# Patient Record
Sex: FEMALE | NOT HISPANIC OR LATINO | Employment: OTHER | URBAN - NONMETROPOLITAN AREA
[De-identification: names, ages, dates, MRNs, and addresses within clinical notes are randomized per-mention and may not be internally consistent; named-entity substitution may affect disease eponyms.]

---

## 2018-07-03 ENCOUNTER — OFFICE VISIT CONVERTED (OUTPATIENT)
Dept: FAMILY MEDICINE CLINIC | Age: 63
End: 2018-07-03
Attending: FAMILY MEDICINE

## 2018-07-03 ENCOUNTER — CONVERSION ENCOUNTER (OUTPATIENT)
Dept: OTHER | Facility: HOSPITAL | Age: 63
End: 2018-07-03

## 2018-07-23 ENCOUNTER — CONVERSION ENCOUNTER (OUTPATIENT)
Dept: MAMMOGRAPHY | Facility: HOSPITAL | Age: 63
End: 2018-07-23

## 2019-01-07 ENCOUNTER — HOSPITAL ENCOUNTER (OUTPATIENT)
Dept: OTHER | Facility: HOSPITAL | Age: 64
Discharge: HOME | End: 2019-01-07
Attending: FAMILY MEDICINE

## 2019-01-07 ENCOUNTER — OFFICE VISIT CONVERTED (OUTPATIENT)
Dept: FAMILY MEDICINE CLINIC | Age: 64
End: 2019-01-07
Attending: FAMILY MEDICINE

## 2019-01-07 LAB
ALBUMIN SERPL-MCNC: 4.9 G/DL (ref 3.5–5)
ALBUMIN/GLOB SERPL: 2.1 {RATIO} (ref 1.4–2.6)
ALP SERPL-CCNC: 39 U/L (ref 43–160)
ALT SERPL-CCNC: 12 U/L (ref 10–40)
ANION GAP SERPL CALC-SCNC: 16 MMOL/L (ref 8–19)
AST SERPL-CCNC: 14 U/L (ref 15–50)
BILIRUB SERPL-MCNC: 0.31 MG/DL (ref 0.2–1.3)
BUN SERPL-MCNC: 8 MG/DL (ref 5–25)
BUN/CREAT SERPL: 10 {RATIO} (ref 6–20)
CALCIUM SERPL-MCNC: 9.2 MG/DL (ref 8.7–10.4)
CHLORIDE SERPL-SCNC: 100 MMOL/L (ref 99–111)
CONV CO2: 27 MMOL/L (ref 22–32)
CONV TOTAL PROTEIN: 7.2 G/DL (ref 6.3–8.2)
CREAT UR-MCNC: 0.82 MG/DL (ref 0.5–0.9)
ERYTHROCYTE [DISTWIDTH] IN BLOOD BY AUTOMATED COUNT: 14.4 % (ref 11.5–14.5)
FERRITIN SERPL-MCNC: 230 NG/ML (ref 10–200)
FOLATE SERPL-MCNC: >20 NG/ML (ref 4.8–20)
GFR SERPLBLD BASED ON 1.73 SQ M-ARVRAT: >60 ML/MIN/{1.73_M2}
GLOBULIN UR ELPH-MCNC: 2.3 G/DL (ref 2–3.5)
GLUCOSE SERPL-MCNC: 95 MG/DL (ref 65–99)
HBA1C MFR BLD: 14.3 G/DL (ref 12–16)
HCT VFR BLD AUTO: 45.2 % (ref 37–47)
IRON SERPL-MCNC: 70 UG/DL (ref 60–170)
MCH RBC QN AUTO: 25.4 PG (ref 27–31)
MCHC RBC AUTO-ENTMCNC: 31.6 G/DL (ref 33–37)
MCV RBC AUTO: 80.4 FL (ref 81–99)
OSMOLALITY SERPL CALC.SUM OF ELEC: 286 MOSM/KG (ref 273–304)
PLATELET # BLD AUTO: 389 10*3/UL (ref 130–400)
PMV BLD AUTO: 9.6 FL (ref 7.4–10.4)
POTASSIUM SERPL-SCNC: 3.7 MMOL/L (ref 3.5–5.3)
RBC # BLD AUTO: 5.62 10*6/UL (ref 4.2–5.4)
SODIUM SERPL-SCNC: 139 MMOL/L (ref 135–147)
T4 FREE SERPL-MCNC: 1.4 NG/DL (ref 0.9–1.8)
TSH SERPL-ACNC: 5.32 M[IU]/L (ref 0.27–4.2)
VIT B12 SERPL-MCNC: >2000 PG/ML (ref 211–911)
WBC # BLD AUTO: 7.05 10*3/UL (ref 4.8–10.8)

## 2019-01-08 LAB — B BURGDOR IGG+IGM SER-ACNC: NORMAL

## 2019-03-08 ENCOUNTER — HOSPITAL ENCOUNTER (OUTPATIENT)
Dept: OTHER | Facility: HOSPITAL | Age: 64
Discharge: HOME | End: 2019-03-08
Attending: FAMILY MEDICINE

## 2019-03-08 LAB — TSH SERPL-ACNC: 0.66 M[IU]/L (ref 0.27–4.2)

## 2019-06-19 ENCOUNTER — HOSPITAL ENCOUNTER (OUTPATIENT)
Dept: MAMMOGRAPHY | Facility: HOSPITAL | Age: 64
Discharge: HOME | End: 2019-06-19

## 2019-08-19 ENCOUNTER — OFFICE VISIT CONVERTED (OUTPATIENT)
Dept: FAMILY MEDICINE CLINIC | Age: 64
End: 2019-08-19
Attending: FAMILY MEDICINE

## 2019-08-19 ENCOUNTER — HOSPITAL ENCOUNTER (OUTPATIENT)
Dept: OTHER | Facility: HOSPITAL | Age: 64
Discharge: HOME | End: 2019-08-19
Attending: FAMILY MEDICINE

## 2019-08-19 LAB
ALBUMIN SERPL-MCNC: 4.6 G/DL (ref 3.5–5)
ALBUMIN/GLOB SERPL: 1.8 {RATIO} (ref 1.4–2.6)
ALP SERPL-CCNC: 47 U/L (ref 43–160)
ALT SERPL-CCNC: 14 U/L (ref 10–40)
ANION GAP SERPL CALC-SCNC: 16 MMOL/L (ref 8–19)
AST SERPL-CCNC: 16 U/L (ref 15–50)
BASOPHILS # BLD MANUAL: 0.06 10*3/UL (ref 0–0.2)
BASOPHILS NFR BLD MANUAL: 1 % (ref 0–3)
BILIRUB SERPL-MCNC: 0.28 MG/DL (ref 0.2–1.3)
BUN SERPL-MCNC: 9 MG/DL (ref 5–25)
BUN/CREAT SERPL: 13 {RATIO} (ref 6–20)
CALCIUM SERPL-MCNC: 9.3 MG/DL (ref 8.7–10.4)
CHLORIDE SERPL-SCNC: 103 MMOL/L (ref 99–111)
CHOLEST SERPL-MCNC: 204 MG/DL (ref 107–200)
CHOLEST/HDLC SERPL: 3.8 {RATIO} (ref 3–6)
CONV CO2: 27 MMOL/L (ref 22–32)
CONV TOTAL PROTEIN: 7.2 G/DL (ref 6.3–8.2)
CREAT UR-MCNC: 0.71 MG/DL (ref 0.5–0.9)
DEPRECATED RDW RBC AUTO: 42.3 FL
EOSINOPHIL # BLD MANUAL: 0.02 10*3/UL (ref 0–0.7)
EOSINOPHIL NFR BLD MANUAL: 0.3 % (ref 0–7)
ERYTHROCYTE [DISTWIDTH] IN BLOOD BY AUTOMATED COUNT: 14.7 % (ref 11.5–14.5)
GFR SERPLBLD BASED ON 1.73 SQ M-ARVRAT: >60 ML/MIN/{1.73_M2}
GLOBULIN UR ELPH-MCNC: 2.6 G/DL (ref 2–3.5)
GLUCOSE SERPL-MCNC: 91 MG/DL (ref 65–99)
GRANS (ABSOLUTE): 3.69 10*3/UL (ref 2–8)
GRANS: 61.7 % (ref 30–85)
HBA1C MFR BLD: 14.6 G/DL (ref 12–16)
HCT VFR BLD AUTO: 45.9 % (ref 37–47)
HDLC SERPL-MCNC: 54 MG/DL (ref 40–60)
IMM GRANULOCYTES # BLD: 0.01 10*3/UL (ref 0–0.54)
IMM GRANULOCYTES NFR BLD: 0.2 % (ref 0–0.43)
LDLC SERPL CALC-MCNC: 120 MG/DL (ref 70–100)
LYMPHOCYTES # BLD MANUAL: 1.8 10*3/UL (ref 1–5)
LYMPHOCYTES NFR BLD MANUAL: 6.7 % (ref 3–10)
MCH RBC QN AUTO: 25.4 PG (ref 27–31)
MCHC RBC AUTO-ENTMCNC: 31.8 G/DL (ref 33–37)
MCV RBC AUTO: 79.8 FL (ref 81–99)
MONOCYTES # BLD AUTO: 0.4 10*3/UL (ref 0.2–1.2)
OSMOLALITY SERPL CALC.SUM OF ELEC: 292 MOSM/KG (ref 273–304)
PLATELET # BLD AUTO: 401 10*3/UL (ref 130–400)
PMV BLD AUTO: 9.6 FL (ref 7.4–10.4)
POTASSIUM SERPL-SCNC: 4 MMOL/L (ref 3.5–5.3)
RBC # BLD AUTO: 5.75 10*6/UL (ref 4.2–5.4)
SODIUM SERPL-SCNC: 142 MMOL/L (ref 135–147)
TRIGL SERPL-MCNC: 152 MG/DL (ref 40–150)
TSH SERPL-ACNC: 0.27 M[IU]/L (ref 0.27–4.2)
VARIANT LYMPHS NFR BLD MANUAL: 30.1 % (ref 20–45)
VLDLC SERPL-MCNC: 30 MG/DL (ref 5–37)
WBC # BLD AUTO: 5.98 10*3/UL (ref 4.8–10.8)

## 2020-01-06 ENCOUNTER — OFFICE VISIT CONVERTED (OUTPATIENT)
Dept: FAMILY MEDICINE CLINIC | Age: 65
End: 2020-01-06
Attending: FAMILY MEDICINE

## 2020-07-17 ENCOUNTER — HOSPITAL ENCOUNTER (OUTPATIENT)
Dept: OTHER | Facility: HOSPITAL | Age: 65
Discharge: HOME | End: 2020-07-17
Attending: OBSTETRICS & GYNECOLOGY

## 2020-07-20 ENCOUNTER — OFFICE VISIT CONVERTED (OUTPATIENT)
Dept: FAMILY MEDICINE CLINIC | Age: 65
End: 2020-07-20
Attending: FAMILY MEDICINE

## 2020-07-21 LAB
25(OH)D3 SERPL-MCNC: 50.2 NG/ML
ALBUMIN SERPL-MCNC: 4.7 G/DL
ALBUMIN/GLOB SERPL: 1.9 {RATIO}
ALP SERPL-CCNC: 44 IU/L
ALT SERPL-CCNC: 21 IU/L
AST SERPL-CCNC: 21 IU/L
BILIRUB SERPL-MCNC: 0.3 MG/DL
BUN SERPL-MCNC: 9 MG/DL
BUN/CREAT SERPL: 11
CALCIUM SERPL-MCNC: 9.4 MG/DL
CHLORIDE SERPL-SCNC: 102 MMOL/L
CHOLEST SERPL-MCNC: 225 MG/DL
CO2 SERPL-SCNC: 26 MMOL/L
CONV TOTAL PROTEIN: 7.2 G/DL
CREAT UR-MCNC: 0.82 MG/DL
ERYTHROCYTE [DISTWIDTH] IN BLOOD BY AUTOMATED COUNT: 14.6 %
GLOBULIN UR ELPH-MCNC: 2.5 G/DL
GLUCOSE SERPL-MCNC: 99 MG/DL
HCT VFR BLD AUTO: 43.5 %
HDLC SERPL-MCNC: 71 MG/DL
HGB BLD-MCNC: 14.4 G/DL
IRON SERPL-MCNC: 112 UG/DL
LDLC SERPL CALC-MCNC: 132 MG/DL
MCH RBC QN AUTO: 26.4 PG
MCHC RBC AUTO-ENTMCNC: 33.1 G/DL
MCV RBC AUTO: 80 FL
PLATELET # BLD AUTO: 401 X10E3/UL
POTASSIUM SERPL-SCNC: 4.4 MMOL/L
RBC # BLD AUTO: 5.45 X10E6/UL
SODIUM SERPL-SCNC: 142 MMOL/L
TRIGL SERPL-MCNC: 109 MG/DL
TSH SERPL-ACNC: 3.67 UIU/ML
VIT B12 SERPL-MCNC: 1566 PG/ML
VLDLC SERPL-MCNC: 22 MG/DL
WBC # BLD AUTO: 6.9 X10E3/UL

## 2021-04-26 ENCOUNTER — HOSPITAL ENCOUNTER (OUTPATIENT)
Dept: OTHER | Facility: HOSPITAL | Age: 66
Discharge: HOME | End: 2021-04-26
Attending: FAMILY MEDICINE

## 2021-04-26 ENCOUNTER — OFFICE VISIT CONVERTED (OUTPATIENT)
Dept: FAMILY MEDICINE CLINIC | Age: 66
End: 2021-04-26
Attending: FAMILY MEDICINE

## 2021-04-26 LAB
ALBUMIN SERPL-MCNC: 4.7 G/DL (ref 3.5–5)
ALBUMIN/GLOB SERPL: 1.8 {RATIO} (ref 1.4–2.6)
ALP SERPL-CCNC: 45 U/L (ref 43–160)
ALT SERPL-CCNC: 19 U/L (ref 10–40)
ANION GAP SERPL CALC-SCNC: 15 MMOL/L (ref 8–19)
AST SERPL-CCNC: 20 U/L (ref 15–50)
BILIRUB SERPL-MCNC: 0.33 MG/DL (ref 0.2–1.3)
BUN SERPL-MCNC: 9 MG/DL (ref 5–25)
BUN/CREAT SERPL: 13 {RATIO} (ref 6–20)
CALCIUM SERPL-MCNC: 9 MG/DL (ref 8.7–10.4)
CHLORIDE SERPL-SCNC: 104 MMOL/L (ref 99–111)
CHOLEST SERPL-MCNC: 218 MG/DL (ref 107–200)
CHOLEST/HDLC SERPL: 3.1 {RATIO} (ref 3–6)
CONV CO2: 28 MMOL/L (ref 22–32)
CONV TOTAL PROTEIN: 7.3 G/DL (ref 6.3–8.2)
CREAT UR-MCNC: 0.72 MG/DL (ref 0.5–0.9)
ERYTHROCYTE [DISTWIDTH] IN BLOOD BY AUTOMATED COUNT: 15.3 % (ref 11.5–14.5)
FERRITIN SERPL-MCNC: 212 NG/ML (ref 10–200)
FOLATE SERPL-MCNC: >20 NG/ML (ref 4.8–20)
GFR SERPLBLD BASED ON 1.73 SQ M-ARVRAT: >60 ML/MIN/{1.73_M2}
GLOBULIN UR ELPH-MCNC: 2.6 G/DL (ref 2–3.5)
GLUCOSE SERPL-MCNC: 96 MG/DL (ref 65–99)
HBA1C MFR BLD: 14.2 G/DL (ref 12–16)
HCT VFR BLD AUTO: 44.6 % (ref 37–47)
HDLC SERPL-MCNC: 70 MG/DL (ref 40–60)
IRON SATN MFR SERPL: 36 % (ref 20–55)
IRON SERPL-MCNC: 127 UG/DL (ref 60–170)
LDLC SERPL CALC-MCNC: 129 MG/DL (ref 70–100)
MCH RBC QN AUTO: 26 PG (ref 27–31)
MCHC RBC AUTO-ENTMCNC: 31.8 G/DL (ref 33–37)
MCV RBC AUTO: 81.7 FL (ref 81–99)
OSMOLALITY SERPL CALC.SUM OF ELEC: 295 MOSM/KG (ref 273–304)
PLATELET # BLD AUTO: 342 10*3/UL (ref 130–400)
PMV BLD AUTO: 9 FL (ref 7.4–10.4)
POTASSIUM SERPL-SCNC: 4.1 MMOL/L (ref 3.5–5.3)
RBC # BLD AUTO: 5.46 10*6/UL (ref 4.2–5.4)
SODIUM SERPL-SCNC: 143 MMOL/L (ref 135–147)
T4 FREE SERPL-MCNC: 1.6 NG/DL (ref 0.9–1.8)
TIBC SERPL-MCNC: 356 UG/DL (ref 245–450)
TRANSFERRIN SERPL-MCNC: 249 MG/DL (ref 250–380)
TRIGL SERPL-MCNC: 95 MG/DL (ref 40–150)
TSH SERPL-ACNC: 2 M[IU]/L (ref 0.27–4.2)
VIT B12 SERPL-MCNC: 1452 PG/ML (ref 211–911)
VLDLC SERPL-MCNC: 19 MG/DL (ref 5–37)
WBC # BLD AUTO: 6.17 10*3/UL (ref 4.8–10.8)

## 2021-05-03 ENCOUNTER — HOSPITAL ENCOUNTER (OUTPATIENT)
Dept: OTHER | Facility: HOSPITAL | Age: 66
Discharge: HOME | End: 2021-05-03
Attending: OBSTETRICS & GYNECOLOGY

## 2021-05-18 NOTE — PROGRESS NOTES
Radha Mittal  1955     Office/Outpatient Visit    Visit Date:  09:38 am    Provider: Edwin Melissa MD (Assistant: Gini Davis RN)    Location: [facility]        Electronically signed by Edwin Melissa MD on  2020 09:08:50 AM                             Subjective:        CC: 'this rash is...'    HPI:       Radha is in today for follow up on a rash.  She has noted this over the last few months.  She says that it is on her arms and legs.  She has occasional issue in the bottom area.  The rash is characterized by an itchy bump.  They seem sporadic, but they are bothering her.  She is not sure what has caused this.  Her  has been her bed partner and has not had similar rash.    ROS:     CONSTITUTIONAL:  Negative for chills and fever.      CARDIOVASCULAR:  Negative for chest pain and palpitations.      RESPIRATORY:  Negative for recent cough and dyspnea.      GASTROINTESTINAL:  Negative for abdominal pain, nausea and vomiting.          Past Medical History / Family History / Social History:         Last Reviewed on 2020 10:04 AM by Edwin Melissa    Past Medical History:         Hypothyroidism         PREVENTIVE HEALTH MAINTENANCE             COLORECTAL CANCER SCREENING: Up to date (colonoscopy q10y; sigmoidoscopy q5y; Cologuard q3y) was last done 16, Results are in chart; colonoscopy with normal results     MAMMOGRAM: Done within last 2 years and results in are chart was last done 2019 with normal results         Surgical History:         : X 2;     Tonsillectomy/Adenoidectomy Procedures: colonoscopy  - recommended again in 2016         Family History:     Father: COPD     Mother: Hyperlipidemia;  Hypothyroidism         Social History:     Occupation: [employer]     Marital Status:      Children: 2 children         Tobacco/Alcohol/Supplements:     Last Reviewed on 2020 10:04 AM by  Edwin Melissa    Tobacco:  Nonsmoker (never smoked);         Alcohol:    Drinks alcohol very infrequently.          Substance Abuse History:     Last Reviewed on 1/06/2020 10:04 AM by Edwin Melissa    NEGATIVE         Mental Health History:     Last Reviewed on 1/06/2020 10:04 AM by Edwin Melissa        Communicable Diseases (eg STDs):     Last Reviewed on 1/06/2020 10:04 AM by Edwin Melissa        Current Problems:     Last Reviewed on 1/06/2020 10:04 AM by Edwin Melissa    Pure hypercholesterolemia    Other specified hypothyroidism    Vitamin B12 deficiency anemia due to intrinsic factor deficiency    Other fatigue    Abnormal weight loss    Encounter for immunization    Rash and other nonspecific skin eruption        Immunizations:     Fluzone (3 + years dose) 11/29/2010    Fluzone pf (3+ years dose) 1/7/2019    Zostavax (Zoster live) 12/20/2013        Allergies:     Last Reviewed on 1/06/2020 10:04 AM by Edwin Melissa    Sulfas:          Current Medications:     Last Reviewed on 1/06/2020 10:04 AM by Edwin Melissa    Synthroid 0.075mg Tablet [Take 1 tablet(s) by mouth daily]    Aspirin (ASA) 81mg Tablets, Enteric Coated [Take 1 tablet(s) by mouth daily]    calcium 500mg 3 tablets q day    Vitamin D3 1,000IU Tablet [1 capsule daily]    Vitamin B12 1,000mcg Tablet [1 every other day]    iron 325 mg 1 every other day    Multivitamins  Tablet [1 tab daily]    Magnesium 250mg OTC daily        Objective:        Vitals:         Current: 1/6/2020 9:42:10 AM    Ht:  5 ft, 0 in;  Wt: 122.6 lbs;  BMI: 23.9T: 98.3 F (oral);  BP: 148/79 mm Hg (right arm, sitting);  P: 89 bpm (right arm (BP Cuff), sitting);  sCr: 0.71 mg/dL;  GFR: 70.65        Exams:     PHYSICAL EXAM:     GENERAL: vital signs recorded - well developed, well nourished;  no apparent distress;     EYES: extraocular movements intact; conjunctiva and cornea are normal; PERRL;     E/N/T:  normal EACs,  TMs, nasal/oral mucosa, teeth, gingiva, and oropharynx;     NECK: range of motion is normal; thyroid is non-palpable;     RESPIRATORY: normal respiratory rate and pattern with no distress; normal breath sounds with no rales, rhonchi, wheezes or rubs;     CARDIOVASCULAR: normal rate; rhythm is regular;  no systolic murmur; no edema;     GASTROINTESTINAL: nontender; normal bowel sounds; no organomegaly;     LYMPHATIC: no enlargement of cervical or facial nodes; no supraclavicular nodes;     BREAST/INTEGUMENT: there are scattered small red bumps noted - no obvious blistering - no tracking to suggest scabies;     NEUROLOGIC: mental status: alert and oriented x 3; cranial nerves II-XII grossly intact;     PSYCHIATRIC: appropriate affect and demeanor; normal psychomotor function;         Assessment:         R21   Rash and other nonspecific skin eruption       Z23   Encounter for immunization           ORDERS:         Radiology/Test Orders:       3014F  Screening mammography results documented and reviewed (PV)1  (In-House)            3017F  Colorectal CA screen results documented and reviewed (PV)  (In-House)              Procedures Ordered:       69436  Immunization administration; one vaccine  (In-House)            REFER  Referral to Specialist or Other Facility  (Send-Out)              Other Orders:       04009  Influenza virus vaccine, quadrivalent, split virus, preservative free 3 years of age & older  (In-House)              Depression screen negative  (In-House)                      Plan:         Rash and other nonspecific skin eruption        REFERRALS:  Referral initiated to a dermatologist.      RECOMMENDATIONS given include: I don't have a clear sense of what is causing this.  I'm going to recommend referral to dermatology since this has been going on for some time.  We will send a copy of her blood work with her.  I don't see obvious medication that would be causing this..  MIPS PHQ-9 Depression  Screening: Completed form scanned and in chart; Total Score 4; Negative Depression Screen           Orders:       REFER  Referral to Specialist or Other Facility  (Send-Out)              Depression screen negative  (In-House)            3014F  Screening mammography results documented and reviewed (PV)1  (In-House)            3017F  Colorectal CA screen results documented and reviewed (PV)  (In-House)              Encounter for immunization          Immunizations:       05002  Immunization administration; one vaccine  (In-House)            15813  Influenza virus vaccine, quadrivalent, split virus, preservative free 3 years of age & older  (In-House)                Dose (ml): 0.5  Site: right deltoid  Route: intramuscular  Administered by: Gini Davis          : [employer]  Lot #: [id number]  Exp: 06/30/2020          NDC: [address]-27            Charge Capture:         Primary Diagnosis:     R21  Rash and other nonspecific skin eruption           Orders:      08420  Office/outpatient visit; established patient, level 3  (In-House)              Depression screen negative  (In-House)            3014F  Screening mammography results documented and reviewed (PV)1  (In-House)            3017F  Colorectal CA screen results documented and reviewed (PV)  (In-House)              Z23  Encounter for immunization           Orders:      96437  Immunization administration; one vaccine  (In-House)            09559  Influenza virus vaccine, quadrivalent, split virus, preservative free 3 years of age & older  (In-House)

## 2021-05-18 NOTE — PROGRESS NOTES
Radha Mittal 1955     Office/Outpatient Visit    Visit Date: Mon, Aug 19, 2019 08:48 am    Provider: Edwin Melissa MD     Location: [facility]        Electronically signed by Edwin Melissa MD on  2019 05:03:12 PM                             SUBJECTIVE:        CC: physical exam         HPI:     Radha is in today for wellness exam.  She is 64 years old and is retired from [employer] where she worked for 24 years.  She does not smoke.  She does not use alcohol.  She does have a history of thyroid issues and some depression.  She is doing well with these issues.  She has some ongoing stressors, but she is doing relatively stable emotionally at this time.  We did prescribe sertraline earlier this year, but she is not taking that now.     ROS:     CONSTITUTIONAL:  Negative for chills and fever.      CARDIOVASCULAR:  Negative for chest pain and palpitations.      RESPIRATORY:  Negative for recent cough and dyspnea.      GASTROINTESTINAL:  Negative for abdominal pain, nausea and vomiting.      INTEGUMENTARY/BREAST:  Negative for atypical mole(s) and rash.          [facility]/:     Last Reviewed on 2019 09:00 AM by Edwin Melissa    Past Medical History:         Hypothyroidism         PREVENTIVE HEALTH MAINTENANCE             COLORECTAL CANCER SCREENING: Up to date (colonoscopy q10y; sigmoidoscopy q5y; Cologuard q3y) was last done 16, Results are in chart; colonoscopy with normal results     MAMMOGRAM: Done within last 2 years and results in are chart was last done 2019 with normal results         Surgical History:         : X 2;     Tonsillectomy/Adenoidectomy Procedures: colonoscopy  - recommended again in 2016         Family History:         Positive for Breast Cancer ( pat. GM ).      Positive for COPD ( mother ).          Social History:     Occupation: [employer]     Marital Status:      Children: 2 children          Tobacco/Alcohol/Supplements:     Last Reviewed on 8/19/2019 09:00 AM by Edwin Melissa    Tobacco:  Nonsmoker (never smoked);         Alcohol:    Drinks alcohol very infrequently.          Substance Abuse History:     Last Reviewed on 8/19/2019 09:00 AM by Edwin Melissa    NEGATIVE         Mental Health History:     Last Reviewed on 8/19/2019 09:00 AM by Edwin Melissa        Communicable Diseases (eg STDs):     Last Reviewed on 8/19/2019 09:00 AM by Edwin Melissa            Current Problems:     Last Reviewed on 8/19/2019 09:00 AM by Edwin Melissa    Unexplained weight loss     Fatigue     Osteopenia     Vitamin D deficiency     Pernicious anemia     Malaise and Fatigue     Acquired hypothyroidism     Hypercholesterolemia     Anxiety     Tachycardia, NOS         Immunizations:     Fluzone (3 + years dose) 11/29/2010     Fluzone pf (3+ years dose) 1/7/2019     Zostavax (Zoster live) 12/20/2013         Allergies:     Last Reviewed on 8/19/2019 09:00 AM by Edwin Melissa    Sulfas:        Current Medications:     Last Reviewed on 8/19/2019 09:00 AM by Edwin Melissa    Synthroid 0.075mg Tablet Take 1 tablet(s) by mouth daily     Aspirin (ASA) 81mg Tablets, Enteric Coated Take 1 tablet(s) by mouth daily     Magnesium 250mg OTC daily     Multivitamins Tablet 1 tab daily     iron 325 mg 1 every other day     Vitamin B12 1,000mcg Tablet 1 every other day     Vitamin D3 1,000IU Tablet 1 capsule daily     calcium 500mg 3 tablets q day         OBJECTIVE:        Vitals:         Current: 8/19/2019 9:10:31 AM    Ht:  5 ft, 0 in;  Wt: 123.6 lbs;  BMI: 24.1    T: 97.8 F (oral);  BP: 148/71 mm Hg (left arm, sitting);  P: 74 bpm (left arm (BP Cuff), sitting);  sCr: 0.82 mg/dL;  GFR: 61.39        Repeat:     9:10:43 AM     BP:   131/67mm Hg (left arm, sitting)         Exams:     PHYSICAL EXAM:     GENERAL: vital signs recorded - well developed, well nourished;  no apparent  distress;     EYES: extraocular movements intact; conjunctiva and cornea are normal; PERRL;     E/N/T:  normal EACs, TMs, nasal/oral mucosa, teeth, gingiva, and oropharynx;     NECK: range of motion is normal; thyroid is non-palpable;     RESPIRATORY: normal respiratory rate and pattern with no distress; normal breath sounds with no rales, rhonchi, wheezes or rubs;     CARDIOVASCULAR: normal rate; rhythm is regular;  no systolic murmur; no edema;     GASTROINTESTINAL: nontender; normal bowel sounds; no organomegaly;     LYMPHATIC: no enlargement of cervical or facial nodes; no supraclavicular nodes;     BREAST/INTEGUMENT: SKIN: no significant rashes or lesions; no suspicious moles;     NEUROLOGIC: mental status: alert and oriented x 3; cranial nerves II-XII grossly intact;     PSYCHIATRIC: appropriate affect and demeanor; normal psychomotor function;         ASSESSMENT           V70.0   Z00.00  Yearly physical exam              DDx:         ORDERS:         Meds Prescribed:       Refill of: Synthroid (Levothyroxine Sodium) 0.075mg Tablet Take 1 tablet(s) by mouth daily  #90 (Ninety) tablet(s) Refills: 3         Radiology/Test Orders:       3014F  Screening mammography results documented and reviewed (PV)1  (In-House)         3017F  Colorectal CA screen results documented and reviewed (PV)  (In-House)           Lab Orders:       [id number]  [facility] PHYSICAL: CMP, CBC, TSH, LIPID: [id number], 19424, 49140, 66397  (Send-Out)         83512  [facility] Urinalysis, automated, with micro  (Send-Out)           Other Orders:         Depression screen negative  (In-House)           Negative EtOH screen  (In-House)                   PLAN:          Yearly physical exam     Radha seems well today.  We will update her labs as noted below and refill her thyroid medication.  No changes are anticipated.      LABORATORY:  Labs ordered to be performed today include PHYSICAL PANEL; CMP, CBC, TSH, LIPID and urinalysis with micro.   [facility] PHQ-9 Depression Screening: Completed form scanned and in chart; Total Score 0; Negative Depression Screen Negative alcohol screen           Orders:       37630  [facility] PHYSICAL: CMP, CBC, TSH, LIPID: [id number], [id number], 18966, 70323  (Send-Out)         15528  [facility] Urinalysis, automated, with micro  (Send-Out)           Depression screen negative  (In-House)           Negative EtOH screen  (In-House)         3014F  Screening mammography results documented and reviewed (PV)1  (In-House)         3017F  Colorectal CA screen results documented and reviewed (PV)  (In-House)             Patient Education Handouts:       Physical Exam 60+ year, Female              Other Prescriptions:       Refill of: Synthroid (Levothyroxine Sodium) 0.075mg Tablet Take 1 tablet(s) by mouth daily  #90 (Ninety) tablet(s) Refills: 3         CHARGE CAPTURE           **Please note: ICD descriptions below are intended for billing purposes only and may not represent clinical diagnoses**        Primary Diagnosis:         V70.0 Yearly physical exam            Z00.00    Encounter for general adult medical examination without abnormal findings              Orders:          01003   Preventive medicine, established patient, age 40-64 years  (In-House)                Depression screen negative  (In-House)                Negative EtOH screen  (In-House)             3014F   Screening mammography results documented and reviewed (PV)1  (In-House)             3017F   Colorectal CA screen results documented and reviewed (PV)  (In-House)

## 2021-05-18 NOTE — PROGRESS NOTES
Radha Mittal  1955     Office/Outpatient Visit    Visit Date:  09:18 am    Provider: Edwin Melissa MD (Assistant: Gini Davis RN)    Location: [facility]        Electronically signed by Edwin Melissa MD on  2020 07:12:51 AM                             Subjective:        CC: physical exam    HPI:       Radha is in today for wellness exam.  She is 65 years old and is retired from [employer].  She does not smoke.  She uses no alcohol.  She is up to date on mammogram and colonoscopy.  She is in good overall health but remains on thyroid medication and is due for blood work.    ROS:     CONSTITUTIONAL:  Negative for chills and fever.      CARDIOVASCULAR:  Negative for chest pain and palpitations.      RESPIRATORY:  Negative for recent cough and dyspnea.      GASTROINTESTINAL:  Negative for abdominal pain, nausea and vomiting.      INTEGUMENTARY/BREAST:  Negative for atypical mole(s) and rash.          Past Medical History / Family History / Social History:         Last Reviewed on 2020 10:04 AM by Edwin Melissa    Past Medical History:         Hypothyroidism         PREVENTIVE HEALTH MAINTENANCE             COLORECTAL CANCER SCREENING: Up to date (colonoscopy q10y; sigmoidoscopy q5y; Cologuard q3y) was last done 16, Results are in chart; colonoscopy with normal results     MAMMOGRAM: Done within last 2 years and results in are chart was last done 2019 with normal results         Surgical History:         : X 2;     Tonsillectomy/Adenoidectomy Procedures: colonoscopy  - recommended again in          Family History:     Father: COPD     Mother: Hyperlipidemia;  Hypothyroidism         Social History:     Occupation: [employer]     Marital Status:      Children: 2 children         Tobacco/Alcohol/Supplements:     Last Reviewed on 2020 10:04 AM by Edwin Melissa    Tobacco:  Nonsmoker  (never smoked);         Alcohol:    Drinks alcohol very infrequently.          Substance Abuse History:     Last Reviewed on 1/06/2020 10:04 AM by Edwin Melissa    NEGATIVE         Mental Health History:     Last Reviewed on 1/06/2020 10:04 AM by Edwin Melissa        Communicable Diseases (eg STDs):     Last Reviewed on 1/06/2020 10:04 AM by Edwin Melissa        Current Problems:     Last Reviewed on 1/06/2020 10:04 AM by Edwin Melissa    Pure hypercholesterolemia    Other specified hypothyroidism    Vitamin B12 deficiency anemia due to intrinsic factor deficiency    Other fatigue    Abnormal weight loss    Encounter for immunization    Rash and other nonspecific skin eruption        Immunizations:     influenza, injectable, quadrivalent, preservative free (FLUZONE QUAD 3539-1851) 1/6/2020    Fluzone (3 + years dose) 11/29/2010    Fluzone pf (3+ years dose) 1/7/2019    Zostavax (Zoster live) 12/20/2013        Allergies:     Last Reviewed on 1/06/2020 10:04 AM by Edwin Melissa    Sulfas:          Current Medications:     Last Reviewed on 1/06/2020 10:04 AM by Edwin Melissa    Synthroid 0.075mg Tablet [Take 1 tablet(s) by mouth daily]    Aspirin (ASA) 81mg Tablets, Enteric Coated [Take 1 tablet(s) by mouth daily]    calcium 500mg 3 tablets q day    Vitamin D3 1,000IU Tablet [1 capsule daily]    Vitamin B12 1,000mcg Tablet [1 every other day]    iron 325 mg 1 every other day    Multivitamins  Tablet [1 tab daily]    Magnesium 250mg OTC daily        Objective:        Vitals:         Current: 7/20/2020 9:22:05 AM    Ht:  5 ft, 0 in;  Wt: 123.4 lbs;  BMI: 24.1T: 97.9 F (oral);  BP: 158/87 mm Hg (right arm, sitting);  P: 77 bpm (right arm (BP Cuff), sitting);  sCr: 0.71 mg/dL;  GFR: 69.96        Repeat:     9:45:5 AM  BP:   159/83mm Hg (right arm, sitting, pulse-74)     Exams:     PHYSICAL EXAM:     GENERAL: vital signs recorded - well developed, well nourished;  no  apparent distress;     EYES: extraocular movements intact; conjunctiva and cornea are normal; PERRL;     NECK: range of motion is normal; thyroid is non-palpable;     RESPIRATORY: normal respiratory rate and pattern with no distress; normal breath sounds with no rales, rhonchi, wheezes or rubs;     CARDIOVASCULAR: normal rate; rhythm is regular;  no systolic murmur; no edema;     GASTROINTESTINAL: nontender; normal bowel sounds; no organomegaly;     LYMPHATIC: no enlargement of cervical or facial nodes; no supraclavicular nodes;     BREAST/INTEGUMENT: SKIN: no significant rashes or lesions; no suspicious moles;     NEUROLOGIC: mental status: alert and oriented x 3; cranial nerves II-XII grossly intact;     PSYCHIATRIC: appropriate affect and demeanor; normal psychomotor function;         Assessment:         Z00.01   Encounter for general adult medical examination with abnormal findings           ORDERS:         Meds Prescribed:       [Refilled] Synthroid 75 mcg oral tablet [Take 1 tablet(s) by mouth daily], #90 (ninety) tablets, Refills: 3 (three)         Radiology/Test Orders:       3017F  Colorectal CA screen results documented and reviewed (PV)  (In-House)              Lab Orders:       [id number]  [id number] [facility] CBC without diff  (Send-Out)            [id number]  [id number] [facility] Comp Metabolic Panel (14)  (Send-Out)            72238  [id number] [facility] Lipid Panel (Excludes LDL/HDL ratio)  (Send-Out)            73114  [id number] [facility] TSH  (Send-Out)            91354  [id number] [facility] Vitamin B12  (Send-Out)            68308  [id number] [facility] Vitamin D, 25-Hydroxy  (Send-Out)            26914  Serum iron  (Send-Out)              Other Orders:         Screening mammogram results documented  (Send-Out)                      Plan:         Encounter for general adult medical examination with abnormal findings    LABORATORY:  Labs ordered to be performed today at [facility] include CBC W/O DIFF, Vitamin B12, and Vitamin D.  CMP Lipid  panel TSH     RECOMMENDATIONS given include: Today, we have reviewed Radha's care.  I'm going to recommend no changes at this time.  We will refill needed medications and update some labs as noted.  She is in very good overall health and is up to date on screenings.  Her blood pressure remains well controlled at home..  MIPS Vaccines Flu and Pneumonia updated in Shot record Screening mammomgram done within last 2 years and results in are chart Colorectal Cancer Screening is up to date and the results are in the chart           Orders:       [id number]  [id number] [facility] CBC without diff  (Send-Out)            [id number]  [id number] [facility] Comp Metabolic Panel (14)  (Send-Out)            13735  [id number] [facility] Lipid Panel (Excludes LDL/HDL ratio)  (Send-Out)            87805  [id number] [facility] TSH  (Send-Out)            86697  [id number] [facility] Vitamin B12  (Send-Out)            21743  [id number] [facility] Vitamin D, 25-Hydroxy  (Send-Out)            41121  Serum iron  (Send-Out)              Screening mammogram results documented  (Send-Out)            3017F  Colorectal CA screen results documented and reviewed (PV)  (In-House)                  Other Prescriptions:       [Refilled] Synthroid 75 mcg oral tablet [Take 1 tablet(s) by mouth daily], #90 (ninety) tablets, Refills: 3 (three)         Charge Capture:         Primary Diagnosis:     Z00.01  Encounter for general adult medical examination with abnormal findings           Orders:      95661  Preventive medicine, established patient, age 40-64 years  (In-House)            3017F  Colorectal CA screen results documented and reviewed (PV)  (In-House)

## 2021-05-18 NOTE — PROGRESS NOTES
Radha MittalLucy 1955     Office/Outpatient Visit    Visit Date: Tue, Jul 3, 2018 08:26 am    Provider: Edwin Melissa MD (Assistant: Crystal Steen MA)    Location: [facility]        Electronically signed by Edwin Melissa MD on  2018 08:13:12 AM                             SUBJECTIVE:        CC: physical exam         HPI:     Radha is in today for wellness exam.  She is 63 years old and soon to retire from [employer] in [place] and [employer].  She does not smoke.  She does not use alcohol.  She has mammogram scheduled this week.  She is up to date on well woman exam.  She did have colonoscopy in 2016 per history which was normal.      Patient to be evaluated for yearly physical exam.          PHQ-9 Depression Screening: Completed form scanned and in chart; Total Score 0 Alcohol Consumption Screening: Completed form scanned and in chart; Total Score 0     ROS:     CONSTITUTIONAL:  Negative for chills and fever.      CARDIOVASCULAR:  Negative for chest pain and palpitations.      RESPIRATORY:  Negative for recent cough and dyspnea.      GASTROINTESTINAL:  Negative for abdominal pain, nausea and vomiting.          PMH/FMH/SH:     Last Reviewed on 2018 08:47 AM by Edwin Melissa    Past Medical History:         Hypothyroidism         PREVENTIVE HEALTH MAINTENANCE             COLORECTAL CANCER SCREENING: Up to date (colonoscopy q10y; sigmoidoscopy q5y; Cologuard q3y) was last done 2011; colonoscopy with the following abnormalities noted-- Polyp(s)     MAMMOGRAM: Done within last 2 years and results in are chart was last done 2017 with normal results         Surgical History:         : X 2;     Tonsillectomy/Adenoidectomy Procedures: colonoscopy  - recommended again in          Family History:         Positive for Breast Cancer ( pat. GM ).      Positive for COPD ( mother ).          Social History:     Occupation: [employer]      Marital Status:      Children: 2 children         Tobacco/Alcohol/Supplements:     Last Reviewed on 7/03/2018 08:47 AM by Edwin Melissa    Tobacco:  Nonsmoker (never smoked);         Alcohol:    Drinks alcohol very infrequently.          Substance Abuse History:     Last Reviewed on 7/03/2018 08:47 AM by Edwin Melissa    NEGATIVE         Mental Health History:     Last Reviewed on 7/03/2018 08:47 AM by Edwin Melissa        Communicable Diseases (eg STDs):     Last Reviewed on 7/03/2018 08:47 AM by Edwin Melissa            Current Problems:     Last Reviewed on 7/03/2018 08:47 AM by Edwin Melissa    Osteopenia     Vitamin D deficiency     Pernicious anemia     Fatigue     Malaise and Fatigue     Acquired hypothyroidism     Hypercholesterolemia     Anxiety     Tachycardia, NOS         Immunizations:     Fluzone (3 + years dose) 11/29/2010     Zostavax (Zoster) 12/20/2013         Allergies:     Last Reviewed on 7/03/2018 08:47 AM by Edwin Melissa    Sulfas:        Current Medications:     Last Reviewed on 7/03/2018 08:47 AM by Edwin Melissa    Synthroid 0.075mg Tablet Take 1 tablet(s) by mouth daily     Aspirin (ASA) 81mg Tablets, Enteric Coated Take 1 tablet(s) by mouth daily     iron 325 mg 1 every other day     Vitamin B12 1,000mcg Tablet 1 every other day     Vitamin D3 1,000IU Tablet 1 capsule daily     calcium 500mg 3 tablets q day         OBJECTIVE:        Vitals:         Current: 7/3/2018 8:33:59 AM    Ht:  5 ft, 0 in;  Wt: 133.2 lbs;  BMI: 26.0    T: 98.3 F (oral);  BP: 140/88 mm Hg (left arm, sitting);  P: 75 bpm (left arm (BP Cuff), sitting);  sCr: 0.81 mg/dL;  GFR: 64.96        Repeat:     8:36:12 AM     BP:   132/76mm Hg (left arm, sitting)         Exams:     PHYSICAL EXAM:     GENERAL: vital signs recorded - well developed, well nourished;  no apparent distress;     EYES: extraocular movements intact; conjunctiva and cornea are normal;  PERRLA;     E/N/T:  normal EACs, TMs, nasal/oral mucosa, teeth, gingiva, and oropharynx;     NECK: range of motion is normal; thyroid is non-palpable;     RESPIRATORY: normal respiratory rate and pattern with no distress; normal breath sounds with no rales, rhonchi, wheezes or rubs;     CARDIOVASCULAR: normal rate; rhythm is regular;  no systolic murmur; no edema;     GASTROINTESTINAL: nontender; normal bowel sounds; no organomegaly;     BREAST/INTEGUMENT: SKIN: no significant rashes or lesions; no suspicious moles;     MUSCULOSKELETAL: there is a cystic area noted on the tip of the R ring finger;     NEUROLOGIC: mental status: alert and oriented x 3; cranial nerves II-XII grossly intact;     PSYCHIATRIC: appropriate affect and demeanor; normal psychomotor function;         ASSESSMENT           V70.0   Z00.00  Yearly physical exam              DDx:     V79.0   Z13.89  Screening for depression              DDx:         ORDERS:         Radiology/Test Orders:       3014F  Screening mammography results documented and reviewed (PV)1  (In-House)         3017F  Colorectal CA screen results documented and reviewed (PV)  (In-House)           Lab Orders:       [id number]  [facility] PHYSICAL: CMP, CBC, TSH, LIPID: [id number], [id number], [id number], [id number]  (Send-Out)           Other Orders:         Depression screen negative  (In-House)           Negative EtOH screen  (In-House)           Calculated BMI above the upper parameter and a follow-up plan was documented in the medical record  (In-House)                   PLAN:          Yearly physical exam     LABORATORY:  Labs ordered to be performed today include PHYSICAL PANEL; CMP, CBC, TSH, LIPID.      RECOMMENDATIONS given include: Radha is in good overall health.  We have reviewed her care in general.  I'm going to arrange labs and go from there.  She is up to date on cancer screenings.  We will fill her thyroid medication after getting labs back..  MIPS PHQ-9 Depression  Screening: Completed form scanned and in chart; Total Score 0 Negative alcohol screen     MAMMOGRAM: Done within last 2 years and results in are chart     COLORECTAL CANCER SCREENING: Results are in chart     BMI Elevated - Follow-Up Plan: She was provided education on weight loss strategies           Orders:       [id number]  [facility] PHYSICAL: CMP, CBC, TSH, LIPID: [id number], [id number], [id number], [id number]  (Send-Out)           Depression screen negative  (In-House)           Negative EtOH screen  (In-House)         3014F  Screening mammography results documented and reviewed (PV)1  (In-House)         3017F  Colorectal CA screen results documented and reviewed (PV)  (In-House)           Calculated BMI above the upper parameter and a follow-up plan was documented in the medical record  (In-House)             Patient Education Handouts:       Physical Exam 60+ year, Female              CHARGE CAPTURE           **Please note: ICD descriptions below are intended for billing purposes only and may not represent clinical diagnoses**        Primary Diagnosis:         V70.0 Yearly physical exam            Z00.00    Encounter for general adult medical examination without abnormal findings              Orders:          [id number]   Preventive medicine, established patient, age 40-64 years  (In-House)                Depression screen negative  (In-House)                Negative EtOH screen  (In-House)             3014F   Screening mammography results documented and reviewed (PV)1  (In-House)             3017F   Colorectal CA screen results documented and reviewed (PV)  (In-House)                Calculated BMI above the upper parameter and a follow-up plan was documented in the medical record  (In-House)           V79.0 Screening for depression            Z13.89    Encounter for screening for other disorder

## 2021-05-18 NOTE — PROGRESS NOTES
Radha Mittal 1955     Office/Outpatient Visit    Visit Date: Mon, Jan 7, 2019 10:37 am    Provider: Edwin Melissa MD (Assistant: Gini Davis RN)    Location: [facility]        Electronically signed by Edwin Melissa MD on  01/08/2019 05:19:10 PM                             SUBJECTIVE:        CC: weight loss, fatigue         HPI:     Radha is in today for follow up on how she feels.  She has not felt well for the last month.  She says that her mother had knee replacement prior to things changing.  She then had a new grandchild in December.  There has been a lot going on.  She is not sleeping well.  She seems to be having a hard time emotionally to some degree.  She does not use alcohol.  She does feel anxious and states that she has been given Xanax in the past.  She did take some left over medication.  She also had some Zoloft and says that everything seemed worse with it.  She is not sure what is going on.  She has been losing some weight as well.          With regard to sleep, she is struggling to get to sleep.  She will also wake up frequently and have difficulty getting back to sleep.  She is having some issue with feeling like the heart racing.  She is not having obvious snoring of concern.  Her  does not think she stops breathing while asleep.     ROS:     CONSTITUTIONAL:  Positive for night sweats.   Negative for chills or fever.      EYES:  Negative for blurred vision.      E/N/T:  Negative for diminished hearing and nasal congestion.      CARDIOVASCULAR:  Positive for palpitations.   Negative for chest pain.      RESPIRATORY:  Negative for recent cough and dyspnea.      GASTROINTESTINAL:  Positive for anorexia ( loss of appetite ).   Negative for abdominal pain, nausea or vomiting.      INTEGUMENTARY/BREAST:  Negative for atypical mole(s) and rash.      PSYCHIATRIC:  Positive for anxiety.   Negative for depression.          PMH/FMH/SH:     Last Reviewed on 1/07/2019 11:00  AM by Edwin Melissa    Past Medical History:         Hypothyroidism         PREVENTIVE HEALTH MAINTENANCE             COLORECTAL CANCER SCREENING: Up to date (colonoscopy q10y; sigmoidoscopy q5y; Cologuard q3y) was last done 16, Results are in chart; colonoscopy with normal results     MAMMOGRAM: Done within last 2 years and results in are chart was last done 2018 with normal results         Surgical History:         : X 2;     Tonsillectomy/Adenoidectomy Procedures: colonoscopy  - recommended again in 2016         Family History:         Positive for Breast Cancer ( pat. GM ).      Positive for COPD ( mother ).          Social History:     Occupation: [employer]     Marital Status:      Children: 2 children         Tobacco/Alcohol/Supplements:     Last Reviewed on 2019 11:00 AM by Edwin Melissa    Tobacco:  Nonsmoker (never smoked);         Alcohol:    Drinks alcohol very infrequently.          Substance Abuse History:     Last Reviewed on 2019 11:00 AM by Edwin Melissa    NEGATIVE         Mental Health History:     Last Reviewed on 2019 11:00 AM by Edwin Melissa        Communicable Diseases (eg STDs):     Last Reviewed on 2019 11:00 AM by Edwin Melissa            Current Problems:     Last Reviewed on 2019 11:00 AM by Edwin Melissa    Fatigue     Osteopenia     Vitamin D deficiency     Pernicious anemia     Malaise and Fatigue     Acquired hypothyroidism     Hypercholesterolemia     Anxiety     Tachycardia, NOS         Immunizations:     Fluzone (3 + years dose) 2010     Zostavax (Zoster live) 2013         Allergies:     Last Reviewed on 2019 11:00 AM by Edwin Melissa    Sulfas:        Current Medications:     Last Reviewed on 2019 11:00 AM by Edwin Melissa    Synthroid 0.075mg Tablet Take 1 tablet(s) by mouth daily     Aspirin (ASA) 81mg Tablets,  Enteric Coated Take 1 tablet(s) by mouth daily     iron 325 mg 1 every other day     Vitamin B12 1,000mcg Tablet 1 every other day     Vitamin D3 1,000IU Tablet 1 capsule daily     calcium 500mg 3 tablets q day     Magnesium 250mg OTC daily     Multivitamins Tablet 1 tab daily         OBJECTIVE:        Vitals:         Current: 1/7/2019 10:40:04 AM    Ht:  5 ft, 0 in;  Wt: 120.4 lbs;  BMI: 23.5    T: 97.9 F (oral);  BP: 155/82 mm Hg (right arm, sitting);  P: 92 bpm (right arm (BP Cuff), sitting);  sCr: 0.84 mg/dL;  GFR: 60.00        Exams:     PHYSICAL EXAM:     GENERAL: vital signs recorded - well developed, well nourished;  no apparent distress;     EYES: extraocular movements intact; conjunctiva and cornea are normal; PERRLA;     E/N/T:  normal EACs, TMs, nasal/oral mucosa, teeth, gingiva, and oropharynx;     NECK: range of motion is normal; thyroid is non-palpable;     RESPIRATORY: normal respiratory rate and pattern with no distress; normal breath sounds with no rales, rhonchi, wheezes or rubs;     CARDIOVASCULAR: normal rate; rhythm is regular;  no systolic murmur; no edema;     GASTROINTESTINAL: nontender; normal bowel sounds; no organomegaly;     LYMPHATIC: no enlargement of cervical or facial nodes; no supraclavicular nodes;     BREAST/INTEGUMENT: SKIN: no significant rashes or lesions; no suspicious moles;     NEUROLOGIC: mental status: alert and oriented x 3; cranial nerves II-XII grossly intact;     PSYCHIATRIC: affect/demeanor: depressed, flat;  normal psychomotor function;         Lab/Test Results:         LABORATORY RESULTS: EKG performed by [facility]         Procedures:     Influenza vaccination     1. Influenza, seasonal PF (children 3 years to adult): 0.5 ml unit dose given IM in the right upper arm; administered by [facility] Regarding contraindications to an Influenza vaccine: Denies moderate/severe illness with/without fever; serious reaction to eggs, egg proteins, gentamicin, gelatin, arginine, neomycin or  polymixin; serious reaction after recieving previous influenza vaccines; and history of Guillain-Moville Syndrome.              ASSESSMENT           780.79   R53.83  Fatigue              DDx:     783.21   R63.4  Unexplained weight loss              DDx:     244.8   E03.8  Acquired hypothyroidism              DDx:     785.1   R00.2  Irregular heart beat              DDx:     V04.81   Z23  Influenza vaccination              DDx:         ORDERS:         Meds Prescribed:       Trazodone HCl 50mg Tablet Take 1/2 to 1 tablet(s) by mouth at bedtime  #30 (Thirty) tablet(s) Refills: 1         Radiology/Test Orders:       [id number]  Electrocardiogram, routine with at least 12 leads; with interpretation and report  (In-House)         3014F  Screening mammography results documented and reviewed (PV)1  (In-House)         3017F  Colorectal CA screen results documented and reviewed (PV)  (In-House)           Lab Orders:       [id number]  BDCB2 - [facility] CBC w/o diff  (Send-Out)         47784  COMP - [facility] Comp. Metabolic Panel  (Send-Out)         65667  THYII - [facility] Thyroid panel with TSH (10749, 16564)  (Send-Out)         82056  LYSCR - [facility] Lyme Ab/Western Blot Reflex  (Send-Out)         61161  B12FO - [facility] Vitamin B12 with Folate  (Send-Out)         66294  FERR - [facility] Ferritin Serum  (Send-Out)         20142  IRON - [facility] Iron, serum  (Send-Out)           Procedures Ordered:       [id number]  Immunization administration; one vaccine  (In-House)           Other Orders:       28959  Influenza virus vaccine, quadrivalent, split virus, preservative free 3 years of age & older  (In-House)           Depression screen positive and follow up plan documented  (In-House)           Negative EtOH screen  (In-House)                   PLAN:          Fatigue     LABORATORY:  Labs ordered to be performed today include B12 with Folate, CBC W/O DIFF, Comprehensive metabolic panel, Ferritin Serum, Iron Serum, and Lyme Ab/Western Blot Reflex.      RECOMMENDATIONS  given include: Radha has been struggling for some time now.  I'm going to recommend labs and EKG as noted.  We may consider SSRI treatment depending on her labs.  Trazodone is prescribed at night at this time as well.  We will see what her testing shows and go from there..  MIPS PHQ-9 Depression Screening Completed form scanned and in chart; Total Score 12 Positive Depression Screen: plan to review testing and consider treatment Negative alcohol screen     MAMMOGRAM: Done within last 2 years and results in are chart     COLORECTAL CANCER SCREENING: Results are in chart           Prescriptions:       Trazodone HCl 50mg Tablet Take 1/2 to 1 tablet(s) by mouth at bedtime  #30 (Thirty) tablet(s) Refills: 1           Orders:       40594  BDCB2 - [facility] CBC w/o diff  (Send-Out)         64174  COMP - [facility] Comp. Metabolic Panel  (Send-Out)         79170  LYSCR - [facility] Lyme Ab/Western Blot Reflex  (Send-Out)         99242  B12FO - [facility] Vitamin B12 with Folate  (Send-Out)         27771  FERR - [facility] Ferritin Serum  (Send-Out)         24639  IRON - [facility] Iron, serum  (Send-Out)         3014F  Screening mammography results documented and reviewed (PV)1  (In-House)         3017F  Colorectal CA screen results documented and reviewed (PV)  (In-House)           Depression screen positive and follow up plan documented  (In-House)           Negative EtOH screen  (In-House)            Unexplained weight loss As above.          Acquired hypothyroidism     LABORATORY:  Labs ordered to be performed today include Thyroid Panel.            Orders:       80947  THYII - [facility] Thyroid panel with TSH (93791, 93186)  (Send-Out)             Patient Education Handouts:       Hypothyroidism           Irregular heart beat         TESTS/PROCEDURES:  Will proceed with an ECG to be performed/scheduled now.            Orders:       40174  Electrocardiogram, routine with at least 12 leads; with interpretation and report  (In-House)            Influenza  vaccination           Orders:       22352  Immunization administration; one vaccine  (In-House)         68818  Influenza virus vaccine, quadrivalent, split virus, preservative free 3 years of age & older  (In-House)               CHARGE CAPTURE           **Please note: ICD descriptions below are intended for billing purposes only and may not represent clinical diagnoses**        Primary Diagnosis:         780.79 Fatigue            R53.83    Other fatigue              Orders:          50644   Office/outpatient visit; established patient, level 4  (In-House)             3014F   Screening mammography results documented and reviewed (PV)1  (In-House)             3017F   Colorectal CA screen results documented and reviewed (PV)  (In-House)                Depression screen positive and follow up plan documented  (In-House)                Negative EtOH screen  (In-House)           783.21 Unexplained weight loss            R63.4    Abnormal weight loss    244.8 Acquired hypothyroidism            E03.8    Other specified hypothyroidism    785.1 Irregular heart beat            R00.2    Palpitations              Orders:          35273   Electrocardiogram, routine with at least 12 leads; with interpretation and report  (In-House)           V04.81 Influenza vaccination            Z23    Encounter for immunization              Orders:          67018   Immunization administration; one vaccine  (In-House)             42501   Influenza virus vaccine, quadrivalent, split virus, preservative free 3 years of age & older  (In-House)

## 2021-05-18 NOTE — PROGRESS NOTES
Radha Mittal  1955     Office/Outpatient Visit    Visit Date: Mon, Apr 26, 2021 09:33 am    Provider: Edwin Melissa MD (Assistant: Gini Davis RN)    Location: [facility]        Electronically signed by Edwin Melissa MD on  04/28/2021 10:47:35 AM                             Subjective:        CC: Welcome to Medicare, thyroid    HPI:           Mrs. Mittal is here for a Medicare wellness visit.  The required HRA questions are integrated within this visit note. Family medical history and individual medical/surgical history were reviewed and updated.  A current height, weight, BMI, blood pressure, and pulse were recorded in the vitals section of the note and have been reviewed. Patient's medications, including supplements, were recorded in the chart and reviewed.  Current providers and suppliers were reviewed and updated.          Self-Assessment of Health: She rates her health as very good. She rates her confidence of being able to control/manage most of her health problems as very confident. Her physical/emotional health has limited her social activites not at all.  A review of cognitive impairment was performed, including ability to drive a car, manage finances, and any memory changes, and was found to be negative.  A review of functional ability, including bathing, dressing, walking, and urine/bowel continence as well as level of safety was performed and was found to be negative.  Falls Risk: Has not had any falls or only one fall without injury in the past year.  She denies having trouble hearing the TV/radio when others do not, having to strain to hear or understand conversations and wearing hearing aid(s).  Concerning home safety, she reports that at home she DOES have adequate lighting, a skid resistant shower/tub, handrails on stairs and functioning smoke alarms, but not grab bars in the bath or absence of throw rugs.          Immunization Status: ** >10 years since last  Td booster; ** Has not received pneumococcal vaccination; ** Has not received Prevnar 13 vaccination; Physical Activity: She exercises but less than 20 minutes 3 days per week; Type of diet patient normally eats is described as well-balanced with fruits and vegetables Tobacco: She has never smoked.  Preventative Health updated today           Additionally, she presents with history of other specified hypothyroidism.  she is currently taking Synthroid, 75 mcg daily.  TSH was last checked more than 6 months ago.  The result was reported as normal.  She denies any related symptoms.  She reports no symptoms suggestive of adverse medication effect.            She also has history of low B12 for which she takes an oral supplement.    ROS:     CONSTITUTIONAL:  Negative for chills and fever.      CARDIOVASCULAR:  Negative for chest pain and palpitations.      RESPIRATORY:  Negative for recent cough and dyspnea.      GASTROINTESTINAL:  Negative for abdominal pain, nausea and vomiting.      INTEGUMENTARY/BREAST:  Negative for atypical mole(s) and rash.          Past Medical History / Family History / Social History:         Last Reviewed on 4/26/2021 09:50 AM by Edwin Melissa    Past Medical History:                 PAST MEDICAL HISTORY         Hypothyroidism             ADVANCE DIRECTIVES: Living will Her , [place], would make decisions for her if needed.          PREVENTIVE HEALTH MAINTENANCE             BONE DENSITY: was last done 8/5/20 with the following abnormality noted-- Osteopenia     COLORECTAL CANCER SCREENING: Up to date (colonoscopy q10y; sigmoidoscopy q5y; Cologuard q3y) was last done 9/7/16, Results are in chart; colonoscopy with normal results     MAMMOGRAM: Done within last 2 years and results in are chart was last done 07/2020 with normal results         PAST MEDICAL HISTORY             CURRENT MEDICAL PROVIDERS:    Dentist: [facility]         Surgical History:          : X 2;     Tonsillectomy/Adenoidectomy Procedures: colonoscopy  - recommended again in 2016         Family History:     Father: COPD     Mother: Hyperlipidemia;  Hypothyroidism         Social History:     Occupation: [employer]     Marital Status:      Children: 2 children         Tobacco/Alcohol/Supplements:     Last Reviewed on 2021 09:50 AM by Edwin Melissa    Tobacco:  Nonsmoker (never smoked);         Alcohol:    Drinks alcohol very infrequently.          Substance Abuse History:     Last Reviewed on 2021 09:50 AM by Edwin Melissa    NEGATIVE         Mental Health History:     Last Reviewed on 2021 09:50 AM by Edwin Melissa        Communicable Diseases (eg STDs):     Last Reviewed on 2021 09:50 AM by Edwin Melissa        Current Problems:     Last Reviewed on 2021 09:50 AM by Edwin Melissa    Pure hypercholesterolemia    Other specified hypothyroidism    Vitamin B12 deficiency anemia due to intrinsic factor deficiency    Other fatigue    Abnormal weight loss    Rash and other nonspecific skin eruption    Encounter for immunization    Encounter for general adult medical examination with abnormal findings    Encounter for screening for osteoporosis    Encounter for general adult medical examination without abnormal findings        Immunizations:     influenza, injectable, quadrivalent, preservative free 10/19/2020    influenza, injectable, quadrivalent, preservative free (FLUZONE QUAD [id number]) 2020    zoster recombinant 2020    zoster recombinant 10/19/2020    Fluzone (3 + years dose) 2010    Fluzone pf (3+ years dose) 2019    Zostavax (Zoster live) 2013        Allergies:     Last Reviewed on 2021 09:50 AM by Edwin Melissa    Sulfas:          Current Medications:     Last Reviewed on 2021 09:50 AM by Edwin Melissa    Synthroid 75 mcg oral tablet  [Take 1 tablet(s) by mouth daily]    aspirin 81 mg oral tablet, delayed release (enteric coated) [Take 1 tablet(s) by mouth daily]    calcium 500mg 3 tablets q day     Vitamin D3 25 mcg (1,000 unit) oral tablet [1 capsule daily]    Vitamin B-12 1,000 mcg oral tablet [1 every other day]    iron 325 mg 1 every other day     multivitamin oral tablet [1 tab daily]    Magnesium 250mg OTC daily     ferrous sulfate 325 mg (65 mg iron) oral tablet [take 1 tablet (325 mg) by oral route once daily]        Objective:        Vitals:         Current: 4/26/2021 9:43:54 AM    Ht:  5 ft, 0 in;  Wt: 124 lbs;  BMI: 24.2T: 97.3 F (oral);  BP: 145/73 mm Hg (left arm, sitting);  P: 88 bpm (left arm (BP Cuff), sitting);  sCr: 0.82 mg/dL;  GFR: 59.92        Repeat:     10:17:12 AM  BP:   151/82mm Hg (left arm, sitting, -84)     Exams:     PHYSICAL EXAM:     GENERAL: vital signs recorded - well developed, well nourished;  no apparent distress;     EYES: extraocular movements intact; conjunctiva and cornea are normal; PERRL; binocular vision is 20/20 - hearing is normal    E/N/T:  normal EACs, TMs, nasal/oral mucosa, teeth, gingiva, and oropharynx;     NECK: range of motion is normal; thyroid is non-palpable;     RESPIRATORY: normal respiratory rate and pattern with no distress; normal breath sounds with no rales, rhonchi, wheezes or rubs;     CARDIOVASCULAR: normal rate; rhythm is regular;  no systolic murmur; no edema;     GASTROINTESTINAL: nontender; normal bowel sounds; no organomegaly;     LYMPHATIC: no enlargement of cervical or facial nodes; no supraclavicular nodes;     BREAST/INTEGUMENT: SKIN: no significant rashes or lesions; no suspicious moles;     NEUROLOGIC: mental status: alert and oriented x 3; cranial nerves II-XII grossly intact;     PSYCHIATRIC: appropriate affect and demeanor; normal psychomotor function;         Lab/Test Results:         LABORATORY RESULTS: EKG performed by tls         Assessment:         Z00.00    Encounter for general adult medical examination without abnormal findings       E78.0   Pure hypercholesterolemia       E03.8   Other specified hypothyroidism       D51.0   Vitamin B12 deficiency anemia due to intrinsic factor deficiency       Z23   Encounter for immunization           ORDERS:         Meds Prescribed:       [Refilled] Synthroid 75 mcg oral tablet [Take 1 tablet(s) by mouth daily], #90 (ninety) tablets, Refills: 3 (three)         Radiology/Test Orders:       3017F  Colorectal CA screen results documented and reviewed (PV)  (In-House)            28672  Electrocardiogram, routine with at least 12 leads; with interpretation and report  (In-House)              Lab Orders:       36366  HTNLP - [facility] CMP AND LIPID: [id number], [id number]  (Send-Out)            54665  THYII - [facility] Thyroid panel with TSH (35687, 22353)  (Send-Out)            74639  B12FO - [facility] Vitamin B12 with Folate  (Send-Out)            30952  BDCB2 - [facility] CBC w/o diff  (Send-Out)            67778  FERR - [facility] Ferritin Serum  (Send-Out)            59635  IRONP - [facility] Iron and TIBC  (Send-Out)              Procedures Ordered:       96406  PNEUMOVAX 23  (In-House)              Other Orders:         Depression screen negative  (In-House)            1101F  Pt screen for fall risk; document no falls in past year or only 1 fall w/o injury in past year (JULIO)  (In-House)              Screening mammogram results documented  (Send-Out)            1123F  Advance Care Planning discussed and doc; advance care plan or surrogate decision maker doc. in MR  (Send-Out)              Welcome to Medicare, 1st 12 months of Medicare enrollment  (x1)          EKG, performed as a screening with Welcome to Medicare  (x1)          Administration of pneumococcal vaccine  (x1)                  Plan:         Encounter for general adult medical examination without abnormal findings        TESTS/PROCEDURES:  Will proceed with an ECG to be performed/scheduled  now.      RECOMMENDATIONS given include: Today, we have reviewed Radha's care.  I'm going to refill needed medications for her and update labs.  I have not opted to make any changes for the near term..  MIPS PHQ-9 Depression Screening: Completed form scanned and in chart; Total Score 2; Negative Depression Screen ADVANCE DIRECTIVES (Please update in PMH): Living will keeps at home           Orders:         Depression screen negative  (In-House)            1101F  Pt screen for fall risk; document no falls in past year or only 1 fall w/o injury in past year (JULIO)  (In-House)              Screening mammogram results documented  (Send-Out)            3017F  Colorectal CA screen results documented and reviewed (PV)  (In-House)            1123F  Advance Care Planning discussed and doc; advance care plan or surrogate decision maker doc. in MR  (Send-Out)            64756  Electrocardiogram, routine with at least 12 leads; with interpretation and report  (In-House)              Welcome to Medicare, 1st 12 months of Medicare enrollment  (x1)          EKG, performed as a screening with Welcome to Medicare  (x1)          Pure hypercholesterolemia    LABORATORY:  Labs ordered to be performed today include HTN/Lipid Panel: CMP, Lipid.            Orders:       29809  HTN - [facility] CMP AND LIPID: [id number], 75705  (Send-Out)              Other specified hypothyroidism    LABORATORY:  Labs ordered to be performed today include Thyroid Panel.            Prescriptions:       [Refilled] Synthroid 75 mcg oral tablet [Take 1 tablet(s) by mouth daily], #90 (ninety) tablets, Refills: 3 (three)           Orders:       14024  THYII - [facility] Thyroid panel with TSH (44998, 75040)  (Send-Out)              Vitamin B12 deficiency anemia due to intrinsic factor deficiency    LABORATORY:  Labs ordered to be performed today include Anemia profile ferritin Serum iron, B12 with Folate, and CBC W/O DIFF.            Orders:       18629   B12FO - [facility] Vitamin B12 with Folate  (Send-Out)            97551  BDCB2 - [facility] CBC w/o diff  (Send-Out)            84904  FERR - [facility] Ferritin Serum  (Send-Out)            88216  IRONP - [facility] Iron and TIBC  (Send-Out)              Encounter for immunization          Immunizations:       01548  PNEUMOVAX 23  (In-House)      pt tolerated well          Dose (ml): 0.5  Site: left deltoid  Route: intramuscular  Administered by: Gini Davis          : [employer]  Lot #: [id number]  Exp: 05/26/2022          NDC: [address]-11        Administration of pneumococcal vaccine  (x1)              Patient Recommendations:        For  Encounter for general adult medical examination without abnormal findings:    I also recommend keeps at home.              Charge Capture:         Primary Diagnosis:     Z00.00  Encounter for general adult medical examination without abnormal findings           Orders:      00312  Preventive medicine, established patient, age 65+ years  (In-House)              Depression screen negative  (In-House)            1101F  Pt screen for fall risk; document no falls in past year or only 1 fall w/o injury in past year (JULIO)  (In-House)            3017F  Colorectal CA screen results documented and reviewed (PV)  (In-House)            48205  Electrocardiogram, routine with at least 12 leads; with interpretation and report  (In-House)              Welcome to Medicare, 1st 12 months of Medicare enrollment  (x1)          EKG, performed as a screening with Welcome to Medicare  (x1)          E78.0  Pure hypercholesterolemia           Orders:      33313-86  Office/outpatient visit; established patient, level 3  (In-House)              E03.8  Other specified hypothyroidism     D51.0  Vitamin B12 deficiency anemia due to intrinsic factor deficiency     Z23  Encounter for immunization           Orders:      06786  PNEUMOVAX 23  (In-House)              Administration of  pneumococcal vaccine  (x1)

## 2021-06-16 ENCOUNTER — TELEPHONE (OUTPATIENT)
Dept: FAMILY MEDICINE CLINIC | Age: 66
End: 2021-06-16

## 2021-06-16 NOTE — TELEPHONE ENCOUNTER
Caller: Radha Mittal    Relationship to patient: Self    Best call back number: [phone]     Patient is needing: PATIENT CALLING RAQUEL PETE BACK, PLEASE CALL BACK, THANK YOU.    UNABLE TO WARM TRANSFER.

## 2021-06-16 NOTE — TELEPHONE ENCOUNTER
Spoke with Radha regarding a bill for her office visit on 4/26/21. Explained to the patient she was seen for the AWV along with an office visit because 3 of her chronic conditions were also managed at that visit. Even the CC said she was there for AWV and her Thyroid. Radha voiced understanding.

## 2021-07-01 VITALS
DIASTOLIC BLOOD PRESSURE: 67 MMHG | HEART RATE: 74 BPM | SYSTOLIC BLOOD PRESSURE: 131 MMHG | TEMPERATURE: 97.8 F | BODY MASS INDEX: 24.26 KG/M2 | HEIGHT: 60 IN | WEIGHT: 123.6 LBS

## 2021-07-01 VITALS
WEIGHT: 133.2 LBS | BODY MASS INDEX: 26.15 KG/M2 | TEMPERATURE: 98.3 F | SYSTOLIC BLOOD PRESSURE: 132 MMHG | HEART RATE: 75 BPM | HEIGHT: 60 IN | DIASTOLIC BLOOD PRESSURE: 76 MMHG

## 2021-07-01 VITALS
BODY MASS INDEX: 23.64 KG/M2 | DIASTOLIC BLOOD PRESSURE: 82 MMHG | HEIGHT: 60 IN | SYSTOLIC BLOOD PRESSURE: 155 MMHG | WEIGHT: 120.4 LBS | HEART RATE: 92 BPM | TEMPERATURE: 97.9 F

## 2021-07-02 VITALS
HEIGHT: 60 IN | WEIGHT: 123.4 LBS | TEMPERATURE: 97.9 F | DIASTOLIC BLOOD PRESSURE: 83 MMHG | HEART RATE: 77 BPM | SYSTOLIC BLOOD PRESSURE: 159 MMHG | BODY MASS INDEX: 24.23 KG/M2

## 2021-07-02 VITALS
TEMPERATURE: 97.3 F | BODY MASS INDEX: 24.35 KG/M2 | HEIGHT: 60 IN | SYSTOLIC BLOOD PRESSURE: 151 MMHG | HEART RATE: 88 BPM | DIASTOLIC BLOOD PRESSURE: 82 MMHG | WEIGHT: 124 LBS

## 2021-07-02 VITALS
DIASTOLIC BLOOD PRESSURE: 79 MMHG | HEIGHT: 60 IN | SYSTOLIC BLOOD PRESSURE: 148 MMHG | WEIGHT: 122.6 LBS | TEMPERATURE: 98.3 F | HEART RATE: 89 BPM | BODY MASS INDEX: 24.07 KG/M2

## 2021-07-26 RX ORDER — LEVOTHYROXINE SODIUM 0.07 MG/1
TABLET ORAL
Qty: 90 TABLET | Refills: 2 | Status: SHIPPED | OUTPATIENT
Start: 2021-07-26 | End: 2021-12-31 | Stop reason: SDUPTHER

## 2021-08-10 ENCOUNTER — TELEPHONE (OUTPATIENT)
Dept: FAMILY MEDICINE CLINIC | Age: 66
End: 2021-08-10

## 2021-11-17 ENCOUNTER — TELEPHONE (OUTPATIENT)
Dept: FAMILY MEDICINE CLINIC | Age: 66
End: 2021-11-17

## 2021-11-17 NOTE — TELEPHONE ENCOUNTER
That would be unusual in my experience.  Happy to see her or we can refer to ENT if she wants.  Thanks.

## 2021-11-17 NOTE — TELEPHONE ENCOUNTER
Provider: TORO EMERY  Caller: LOLI RIVERO  Relationship to Patient: SELF  Phone Number: [phone]   Reason for Call: PATIENT STATES SHE HAS DEVELOPED THE WORSE TINITIS IN EAR. WANTS TO KNOW IF LOW BLOOD PRESSURE CAN CAUSE THIS.     PATIENT WOULD LIKE FOR FAUZIA TO CALL HER BACK

## 2021-12-03 ENCOUNTER — TELEPHONE (OUTPATIENT)
Dept: FAMILY MEDICINE CLINIC | Age: 66
End: 2021-12-03

## 2021-12-03 NOTE — TELEPHONE ENCOUNTER
We flushed years all the time. It would be hard to say whether wax is part of the issue without looking at the ear. Happy to refer to ENT for second opinion though if she would like. Thanks.

## 2021-12-03 NOTE — TELEPHONE ENCOUNTER
Pt states she has been having issues with tinnitus in right ear, she spoke to her son (who is a physical therapist) and he told her it was TMJ. She would like to know if she came in for appt, could you flush ears out if she had wax, or should she see ENT he has recommended. Please advise.

## 2021-12-08 ENCOUNTER — TELEPHONE (OUTPATIENT)
Dept: FAMILY MEDICINE CLINIC | Age: 66
End: 2021-12-08

## 2021-12-08 ENCOUNTER — OFFICE VISIT (OUTPATIENT)
Dept: FAMILY MEDICINE CLINIC | Age: 66
End: 2021-12-08

## 2021-12-08 VITALS
HEIGHT: 59 IN | HEART RATE: 96 BPM | BODY MASS INDEX: 24.6 KG/M2 | DIASTOLIC BLOOD PRESSURE: 82 MMHG | OXYGEN SATURATION: 98 % | TEMPERATURE: 97.9 F | WEIGHT: 122 LBS | SYSTOLIC BLOOD PRESSURE: 152 MMHG

## 2021-12-08 DIAGNOSIS — H69.83 EUSTACHIAN TUBE DYSFUNCTION, BILATERAL: Primary | ICD-10-CM

## 2021-12-08 PROBLEM — R21 RASH: Status: ACTIVE | Noted: 2021-12-08

## 2021-12-08 PROBLEM — E03.9 HYPOTHYROIDISM: Status: ACTIVE | Noted: 2021-12-08

## 2021-12-08 PROBLEM — E78.00 HYPERCHOLESTEROLEMIA: Status: ACTIVE | Noted: 2021-12-08

## 2021-12-08 PROBLEM — D51.9 VITAMIN B12 DEFICIENCY ANEMIA: Status: ACTIVE | Noted: 2021-12-08

## 2021-12-08 PROCEDURE — 99213 OFFICE O/P EST LOW 20 MIN: CPT | Performed by: NURSE PRACTITIONER

## 2021-12-08 RX ORDER — ASPIRIN 81 MG/1
TABLET ORAL
COMMUNITY

## 2021-12-08 RX ORDER — LANOLIN ALCOHOL/MO/W.PET/CERES
1000 CREAM (GRAM) TOPICAL EVERY OTHER DAY
COMMUNITY

## 2021-12-08 RX ORDER — FLUTICASONE PROPIONATE 50 MCG
2 SPRAY, SUSPENSION (ML) NASAL DAILY
Qty: 48 ML | Refills: 3 | Status: SHIPPED | OUTPATIENT
Start: 2021-12-08 | End: 2022-04-28 | Stop reason: SDUPTHER

## 2021-12-08 RX ORDER — FERROUS SULFATE 325(65) MG
TABLET ORAL
COMMUNITY
End: 2022-02-14 | Stop reason: SDUPTHER

## 2021-12-08 RX ORDER — IBUPROFEN 200 MG
CAPSULE ORAL
COMMUNITY

## 2021-12-08 RX ORDER — MULTIVITAMIN WITH IRON
TABLET ORAL EVERY OTHER DAY
COMMUNITY

## 2021-12-08 NOTE — PROGRESS NOTES
Chief Complaint  Radha Mittal presents to [facility] for Tinnitus (L)    Subjective          History of Present Illness    Mrs Mittal is here today with c/o left ear tinnitus. Has had for a couple of years but has worsened since November. Describes as a hissing, buzzing sound. Her son specializes in TMJ and she has been doing therapy for that. She has been using OTC ear ache relief drops. She notes that her right ear has felt full. Reports no medication changes.  She notes worse symptoms when going from cold outside to warmer inside weather.    Review of Systems      Allergies   Allergen Reactions   • Sulfa Antibiotics Unknown - Low Severity      History reviewed. No pertinent past medical history.  Current Outpatient Medications   Medication Sig Dispense Refill   • aspirin (aspirin) 81 MG EC tablet Aspir-81 81 mg oral tablet,delayed release (DR/EC) take 1 tablet (81 mg) by oral route once daily   Active     • calcium carbonate (OS-DIANA) 1250 (500 Ca) MG tablet Calcium 500 500 mg calcium (1,250 mg) oral tablet take 1 tablet by oral route daily   Active     • Cholecalciferol 25 MCG (1000 UT) capsule Vitamin D3 1,000 unit oral capsule take 1 capsule by oral route daily   Active     • ferrous sulfate 325 (65 FE) MG tablet iron 325 mg (65 mg iron) oral tablet take 1 tablet (325 mg) by oral route once daily   Active     • levothyroxine (SYNTHROID, LEVOTHROID) 75 MCG tablet Take 1 tablet by mouth once daily 90 tablet 2   • Magnesium 250 MG tablet Take  by mouth Every Other Day.     • vitamin B-12 (CYANOCOBALAMIN) 1000 MCG tablet Take 1,000 mcg by mouth Every Other Day.     • Zinc 50 MG capsule Take  by mouth Every Other Day.     • fluticasone (Flonase) 50 MCG/ACT nasal spray 2 sprays into the nostril(s) as directed by provider Daily. 48 mL 3     No current facility-administered medications for this visit.     History reviewed. No pertinent surgical history.   Social History     Tobacco Use  "  • Smoking status: Never Smoker   • Smokeless tobacco: Never Used   Vaping Use   • Vaping Use: Never used   Substance Use Topics   • Alcohol use: Not on file   • Drug use: Not on file     History reviewed. No pertinent family history.  Health Maintenance Due   Topic Date Due   • DXA SCAN  Never done   • COLORECTAL CANCER SCREENING  Never done   • TDAP/TD VACCINES (1 - Tdap) Never done   • Pneumococcal Vaccine 65+ (1 of 1 - PPSV23) Never done   • HEPATITIS C SCREENING  Never done   • ANNUAL WELLNESS VISIT  Never done      Immunization History   Administered Date(s) Administered   • COVID-19 (PFIZER) 09/10/2021, 10/01/2021   • Flu Vaccine Split Quad 10/19/2020   • Fluzone High-Dose 65+yrs 10/19/2021   • Shingrix 07/22/2020, 10/19/2020   • Zostavax 12/20/2013        Objective     Vitals:    12/08/21 0820   BP: 152/82   Pulse: 96   Temp: 97.9 °F (36.6 °C)   SpO2: 98%   Weight: 55.3 kg (122 lb)   Height: 149.9 cm (59\")     Body mass index is 24.64 kg/m².     Physical Exam  Vitals reviewed.   Constitutional:       General: She is not in acute distress.     Appearance: Normal appearance. She is well-developed.   HENT:      Head: Normocephalic and atraumatic.      Right Ear: Ear canal and external ear normal. A middle ear effusion is present.      Left Ear: Ear canal and external ear normal. A middle ear effusion is present.      Nose: Nose normal.      Mouth/Throat:      Tonsils: No tonsillar exudate or tonsillar abscesses.      Comments: PND  Cardiovascular:      Rate and Rhythm: Normal rate and regular rhythm.   Pulmonary:      Effort: Pulmonary effort is normal.      Breath sounds: Normal breath sounds.   Neurological:      Mental Status: She is alert and oriented to person, place, and time.   Psychiatric:         Mood and Affect: Mood and affect normal.           Result Review :     The following data was reviewed by: JACK Fajardo on 12/08/2021:          Thyroid Profile (Free T4 with TSH) (04/26/2021 " 10:45)  Iron Profile (04/26/2021 10:45)  Lipid Panel (04/26/2021 10:45)  Ferritin (04/26/2021 10:45)  CBC (No Diff) (04/26/2021 10:45)  Vitamin B12 & Folate (04/26/2021 10:45)                  Assessment and Plan      Diagnoses and all orders for this visit:    1. Eustachian tube dysfunction, bilateral (Primary)  Comments:  Take antihistamine once daily as well as flonase one spray each nostril once daily. F/U persistent or worsening symptoms.   Orders:  -     fluticasone (Flonase) 50 MCG/ACT nasal spray; 2 sprays into the nostril(s) as directed by provider Daily.  Dispense: 48 mL; Refill: 3              Follow Up     Return for Chronic visit follow up, with PCP.

## 2021-12-08 NOTE — TELEPHONE ENCOUNTER
Caller: Radha Mittal    Relationship: Self    Best call back number:[phone]     What is the best time to reach you: ANY     Who are you requesting to speak with (clinical staff, provider,  specific staff member): PRASANTH CAGE     What was the call regarding: PATIENT WOULD LIKE TO SPEAK TO NURSE ABOUT tinnitus AND IF IT RAISES BLOOD PRESSURE     Do you require a callback: YES

## 2021-12-08 NOTE — TELEPHONE ENCOUNTER
They are not usually related to one another.  She may need to schedule a follow-up appointment for evaluation.  Thanks.

## 2021-12-28 ENCOUNTER — TELEPHONE (OUTPATIENT)
Dept: FAMILY MEDICINE CLINIC | Age: 66
End: 2021-12-28

## 2021-12-28 NOTE — TELEPHONE ENCOUNTER
Caller: Radha Mittal    Relationship to patient: Self    Best call back number: [phone]    Patient is needing: PATIENT CALLED STATING SHE HAS AN APPT WITH HER PCP ON 01/04/2021. SHE STATED SHE WOULD LIKE TO SPEAK WITH FAUZIA DUE TO HER FEELING LIKE SHE NEEDS TO BE SEEN SOONER AND WHO SHE SUGGEST ANY ONE ELSE IN THE OFFICE THAT SHE CAN SEE FOR THAT APPT. THE PATIENT WOULD LIKE A CALL BACK PLEASE ADVISE THANK YOU.

## 2021-12-30 ENCOUNTER — HOSPITAL ENCOUNTER (OUTPATIENT)
Dept: GENERAL RADIOLOGY | Facility: HOSPITAL | Age: 66
Discharge: HOME | End: 2021-12-30

## 2021-12-30 ENCOUNTER — OFFICE VISIT (OUTPATIENT)
Dept: FAMILY MEDICINE CLINIC | Age: 66
End: 2021-12-30

## 2021-12-30 ENCOUNTER — LAB (OUTPATIENT)
Dept: LAB | Facility: HOSPITAL | Age: 66
End: 2021-12-30

## 2021-12-30 ENCOUNTER — PATIENT MESSAGE (OUTPATIENT)
Dept: FAMILY MEDICINE CLINIC | Age: 66
End: 2021-12-30

## 2021-12-30 ENCOUNTER — TELEPHONE (OUTPATIENT)
Dept: FAMILY MEDICINE CLINIC | Age: 66
End: 2021-12-30

## 2021-12-30 VITALS
HEART RATE: 109 BPM | WEIGHT: 120.2 LBS | DIASTOLIC BLOOD PRESSURE: 93 MMHG | SYSTOLIC BLOOD PRESSURE: 158 MMHG | BODY MASS INDEX: 24.23 KG/M2 | HEIGHT: 59 IN

## 2021-12-30 DIAGNOSIS — Z11.59 NEED FOR HEPATITIS C SCREENING TEST: ICD-10-CM

## 2021-12-30 DIAGNOSIS — E03.9 ACQUIRED HYPOTHYROIDISM: ICD-10-CM

## 2021-12-30 DIAGNOSIS — R53.83 FATIGUE, UNSPECIFIED TYPE: ICD-10-CM

## 2021-12-30 DIAGNOSIS — R09.89 BIBASILAR CRACKLES: ICD-10-CM

## 2021-12-30 DIAGNOSIS — D51.9 ANEMIA DUE TO VITAMIN B12 DEFICIENCY, UNSPECIFIED B12 DEFICIENCY TYPE: ICD-10-CM

## 2021-12-30 DIAGNOSIS — H93.12 TINNITUS OF LEFT EAR: Primary | ICD-10-CM

## 2021-12-30 LAB
ALBUMIN SERPL-MCNC: 4.9 G/DL (ref 3.5–5.2)
ALBUMIN/GLOB SERPL: 2 G/DL
ALP SERPL-CCNC: 47 U/L (ref 39–117)
ALT SERPL W P-5'-P-CCNC: 12 U/L (ref 1–33)
ANION GAP SERPL CALCULATED.3IONS-SCNC: 7.8 MMOL/L (ref 5–15)
AST SERPL-CCNC: 14 U/L (ref 1–32)
BILIRUB SERPL-MCNC: 0.2 MG/DL (ref 0–1.2)
BUN SERPL-MCNC: 5 MG/DL (ref 8–23)
BUN/CREAT SERPL: 8.1 (ref 7–25)
CALCIUM SPEC-SCNC: 9.4 MG/DL (ref 8.6–10.5)
CHLORIDE SERPL-SCNC: 102 MMOL/L (ref 98–107)
CO2 SERPL-SCNC: 29.2 MMOL/L (ref 22–29)
CREAT SERPL-MCNC: 0.62 MG/DL (ref 0.57–1)
DEPRECATED RDW RBC AUTO: 44.1 FL (ref 37–54)
ERYTHROCYTE [DISTWIDTH] IN BLOOD BY AUTOMATED COUNT: 15.4 % (ref 12.3–15.4)
ERYTHROCYTE [SEDIMENTATION RATE] IN BLOOD: 12 MM/HR (ref 0–30)
FOLATE SERPL-MCNC: >20 NG/ML (ref 4.78–24.2)
GFR SERPL CREATININE-BSD FRML MDRD: 117 ML/MIN/1.73
GFR SERPL CREATININE-BSD FRML MDRD: 97 ML/MIN/1.73
GLOBULIN UR ELPH-MCNC: 2.4 GM/DL
GLUCOSE SERPL-MCNC: 105 MG/DL (ref 65–99)
HCT VFR BLD AUTO: 46.5 % (ref 34–46.6)
HCV AB SER DONR QL: NORMAL
HGB BLD-MCNC: 14.7 G/DL (ref 12–15.9)
MAGNESIUM SERPL-MCNC: 2.4 MG/DL (ref 1.6–2.4)
MCH RBC QN AUTO: 25.5 PG (ref 26.6–33)
MCHC RBC AUTO-ENTMCNC: 31.6 G/DL (ref 31.5–35.7)
MCV RBC AUTO: 80.7 FL (ref 79–97)
PLATELET # BLD AUTO: 393 10*3/MM3 (ref 140–450)
PMV BLD AUTO: 8.8 FL (ref 6–12)
POTASSIUM SERPL-SCNC: 3.9 MMOL/L (ref 3.5–5.2)
PROT SERPL-MCNC: 7.3 G/DL (ref 6–8.5)
RBC # BLD AUTO: 5.76 10*6/MM3 (ref 3.77–5.28)
SODIUM SERPL-SCNC: 139 MMOL/L (ref 136–145)
TSH SERPL DL<=0.05 MIU/L-ACNC: 5.59 UIU/ML (ref 0.27–4.2)
VIT B12 BLD-MCNC: 1762 PG/ML (ref 211–946)
WBC NRBC COR # BLD: 8.11 10*3/MM3 (ref 3.4–10.8)

## 2021-12-30 PROCEDURE — 82746 ASSAY OF FOLIC ACID SERUM: CPT

## 2021-12-30 PROCEDURE — 85652 RBC SED RATE AUTOMATED: CPT

## 2021-12-30 PROCEDURE — 84443 ASSAY THYROID STIM HORMONE: CPT

## 2021-12-30 PROCEDURE — 86803 HEPATITIS C AB TEST: CPT

## 2021-12-30 PROCEDURE — 36415 COLL VENOUS BLD VENIPUNCTURE: CPT

## 2021-12-30 PROCEDURE — 71046 X-RAY EXAM CHEST 2 VIEWS: CPT

## 2021-12-30 PROCEDURE — 99214 OFFICE O/P EST MOD 30 MIN: CPT | Performed by: FAMILY MEDICINE

## 2021-12-30 PROCEDURE — 85027 COMPLETE CBC AUTOMATED: CPT

## 2021-12-30 PROCEDURE — 83735 ASSAY OF MAGNESIUM: CPT

## 2021-12-30 PROCEDURE — 80053 COMPREHEN METABOLIC PANEL: CPT

## 2021-12-30 PROCEDURE — 82607 VITAMIN B-12: CPT

## 2021-12-30 RX ORDER — NORTRIPTYLINE HYDROCHLORIDE 25 MG/1
25 CAPSULE ORAL NIGHTLY
Qty: 30 CAPSULE | Refills: 1 | Status: SHIPPED | OUTPATIENT
Start: 2021-12-30 | End: 2021-12-30

## 2021-12-30 RX ORDER — HYDROXYZINE HYDROCHLORIDE 25 MG/1
25 TABLET, FILM COATED ORAL EVERY 8 HOURS PRN
Qty: 30 TABLET | Refills: 1 | Status: SHIPPED | OUTPATIENT
Start: 2021-12-30 | End: 2022-04-28

## 2021-12-30 NOTE — TELEPHONE ENCOUNTER
I can send hydroxyzine for them to try.  They can check after hours.  I won't be able to address until after 5:00.  Thanks.

## 2021-12-30 NOTE — TELEPHONE ENCOUNTER
[place] mahan, states pt has been on hydroxyzine before, wondering if she could try that again or should she go ahead and mati the nortriptyline

## 2021-12-30 NOTE — PROGRESS NOTES
"Radha Mittal presents to [facility].    Chief Complaint:  Follow up on tinnitus    Subjective       History of Present Illness:  Radha is in today for follow up on tinnitus.  She has been dealing with this for the last 3 years roughly.  However, it does seem to have worsened recently.  She has seen ENT about this in the last couple of weeks.  She is having difficulty that seems to be more challenging.  ENT recommended some type of sound at night to help mask the noise.  She is really struggling with certain sounds.  She describes closing the microwave door and that being uncomfortable for her.  She also describes her  stirring his milk this morning and how it almost drove her crazy.  She is emotional because of the significance of the tinnitus.  She is unsure at this point how to move forward.  She denies significant dizziness with this.  Initially, this sound reminded her of a hissing.  However, more recently, it has caused the sound of \"thousand crickets\".  Again, it is different for her and very uncomfortable.      Review of Systems:  Review of Systems   Constitutional: Negative for chills and fever.   HENT: Positive for congestion and sore throat (some throat irritation).    Respiratory: Negative for cough and shortness of breath.    Cardiovascular: Negative for chest pain and palpitations.   Gastrointestinal: Negative for abdominal pain, nausea and vomiting.        Objective   Medical History:  No past medical history on file.  No past surgical history on file.   History reviewed. No pertinent family history.  Social History     Tobacco Use   • Smoking status: Never Smoker   • Smokeless tobacco: Never Used   Substance Use Topics   • Alcohol use: Not Currently       Health Maintenance Due   Topic Date Due   • DXA SCAN  Never done   • COLORECTAL CANCER SCREENING  Never done   • TDAP/TD VACCINES (1 - Tdap) Never done   • Pneumococcal Vaccine 65+ (1 of 1 - PPSV23) Never done " "  • HEPATITIS C SCREENING  Never done   • ANNUAL WELLNESS VISIT  Never done        Immunization History   Administered Date(s) Administered   • COVID-19 (PFIZER) 09/10/2021, 10/01/2021   • Flu Vaccine Split Quad 10/19/2020   • Fluzone High-Dose 65+yrs 10/19/2021   • Shingrix 07/22/2020, 10/19/2020   • Zostavax 12/20/2013       Allergies   Allergen Reactions   • Sulfa Antibiotics Unknown - Low Severity        Medications:  Current Outpatient Medications on File Prior to Visit   Medication Sig   • aspirin (aspirin) 81 MG EC tablet Aspir-81 81 mg oral tablet,delayed release (DR/EC) take 1 tablet (81 mg) by oral route once daily   Active   • calcium carbonate (OS-DIANA) 1250 (500 Ca) MG tablet Calcium 500 500 mg calcium (1,250 mg) oral tablet take 1 tablet by oral route daily   Active   • Cholecalciferol 25 MCG (1000 UT) capsule Vitamin D3 1,000 unit oral capsule take 1 capsule by oral route daily   Active   • ferrous sulfate 325 (65 FE) MG tablet iron 325 mg (65 mg iron) oral tablet take 1 tablet (325 mg) by oral route once daily   Active   • fluticasone (Flonase) 50 MCG/ACT nasal spray 2 sprays into the nostril(s) as directed by provider Daily.   • levothyroxine (SYNTHROID, LEVOTHROID) 75 MCG tablet Take 1 tablet by mouth once daily   • Magnesium 250 MG tablet Take  by mouth Every Other Day.   • vitamin B-12 (CYANOCOBALAMIN) 1000 MCG tablet Take 1,000 mcg by mouth Every Other Day.   • Zinc 50 MG capsule Take  by mouth Every Other Day.     No current facility-administered medications on file prior to visit.       Vital Signs:   /89 (BP Location: Left arm, Patient Position: Sitting)   Pulse 103   Ht 149.9 cm (59\")   Wt 54.5 kg (120 lb 3.2 oz)   BMI 24.28 kg/m²       Physical Exam:  Physical Exam  Vitals and nursing note reviewed.   Constitutional:       General: She is not in acute distress.     Appearance: She is not ill-appearing.   HENT:      Right Ear: Tympanic membrane and ear canal normal.      Left Ear: " Tympanic membrane and ear canal normal.      Mouth/Throat:      Mouth: Mucous membranes are moist.      Comments: Pharynx appears normal  Eyes:      Extraocular Movements: Extraocular movements intact.      Pupils: Pupils are equal, round, and reactive to light.   Neck:      Thyroid: No thyromegaly.   Cardiovascular:      Rate and Rhythm: Normal rate and regular rhythm.      Heart sounds: No murmur heard.      Pulmonary:      Effort: Pulmonary effort is normal.      Breath sounds: Rales (crackles at both bases) present.   Abdominal:      General: There is no distension.      Palpations: Abdomen is soft. There is no mass.      Tenderness: There is no abdominal tenderness.   Musculoskeletal:      Cervical back: Normal range of motion.   Skin:     Findings: No lesion or rash.   Neurological:      General: No focal deficit present.      Mental Status: She is oriented to person, place, and time.      Cranial Nerves: No cranial nerve deficit.   Psychiatric:         Mood and Affect: Mood normal.      Comments: She is anxious and concerned about this.         Result Review      The following data was reviewed by Edwin Melissa MD on 12/30/2021.  Lab Results   Component Value Date    WBC 6.17 04/26/2021    HGB 14.20 04/26/2021    HCT 44.6 04/26/2021    MCV 81.7 04/26/2021    .00 04/26/2021     Lab Results   Component Value Date    GLUCOSE 96 04/26/2021    BUN 9 04/26/2021    CREATININE 0.72 04/26/2021     04/26/2021    K 4.1 04/26/2021     04/26/2021    CO2 28 04/26/2021    CALCIUM 9.0 04/26/2021    PROTEINTOT 7.3 04/26/2021    ALBUMIN 4.7 04/26/2021    ALT 19 04/26/2021    AST 20 04/26/2021    ALKPHOS 45 04/26/2021    BILITOT 0.33 04/26/2021    EGFRIFNONA 76 07/20/2020    GLOB 2.6 04/26/2021    BCR 13 04/26/2021    ANIONGAP 15 04/26/2021      Lab Results   Component Value Date    CHLPL 218 (H) 04/26/2021    TRIG 95 04/26/2021    HDL 70 (H) 04/26/2021     (H) 04/26/2021     Lab Results    Component Value Date    TSH 2.000 04/26/2021     No results found for: HGBA1C           Assessment and Plan:   She is frustrated and quite concerned about the ongoing tinnitus.  Her symptoms seem to be escalating.  She is not sleeping well.  This is impacting her appetite.  She is also more anxious in part because of this.  The way she describes things gives me concern for possible neurologic cause.  I do think an MRI should be considered and we will move forward with ordering that.  If we have difficulty getting that to go through for some reason, we will reach out to ENT for their guidance.  She saw JACK Trammell.  We will start her on nortriptyline at a low dose at night to see if it is of benefit with regard to rest.  I am also going to update labs for her as noted below.       Diagnoses and all orders for this visit:    1. Tinnitus of left ear (Primary)  -     MRI Brain Without Contrast; Future  -     nortriptyline (PAMELOR) 25 MG capsule; Take 1 capsule by mouth Every Night.  Dispense: 30 capsule; Refill: 1    2. Acquired hypothyroidism  -     TSH; Future    3. Anemia due to vitamin B12 deficiency, unspecified B12 deficiency type  -     CBC (No Diff); Future  -     Vitamin B12 & Folate; Future    4. Need for hepatitis C screening test  -     Hepatitis C Antibody; Future    5. Fatigue, unspecified type  -     CBC (No Diff); Future  -     Comprehensive Metabolic Panel; Future  -     Sedimentation Rate; Future  -     Magnesium; Future  -     nortriptyline (PAMELOR) 25 MG capsule; Take 1 capsule by mouth Every Night.  Dispense: 30 capsule; Refill: 1    6. Bibasilar crackles  -     XR Chest 2 View; Future          Follow Up   Return if symptoms worsen or fail to improve.  Patient was given instructions and counseling regarding her condition or for health maintenance advice. Please see specific information pulled into the AVS if appropriate.

## 2021-12-30 NOTE — TELEPHONE ENCOUNTER
Noted.  I don't necessarily have an alternative at this time.  We will be in touch when her testing comes back.  Thanks.

## 2021-12-30 NOTE — TELEPHONE ENCOUNTER
Caller: MIGUEL RIVERO    Relationship: Emergency Contact    Best call back number: [phone]    What medications are you currently taking:   Current Outpatient Medications on File Prior to Visit   Medication Sig Dispense Refill   • aspirin (aspirin) 81 MG EC tablet Aspir-81 81 mg oral tablet,delayed release (DR/EC) take 1 tablet (81 mg) by oral route once daily   Active     • calcium carbonate (OS-DIANA) 1250 (500 Ca) MG tablet Calcium 500 500 mg calcium (1,250 mg) oral tablet take 1 tablet by oral route daily   Active     • Cholecalciferol 25 MCG (1000 UT) capsule Vitamin D3 1,000 unit oral capsule take 1 capsule by oral route daily   Active     • ferrous sulfate 325 (65 FE) MG tablet iron 325 mg (65 mg iron) oral tablet take 1 tablet (325 mg) by oral route once daily   Active     • fluticasone (Flonase) 50 MCG/ACT nasal spray 2 sprays into the nostril(s) as directed by provider Daily. 48 mL 3   • levothyroxine (SYNTHROID, LEVOTHROID) 75 MCG tablet Take 1 tablet by mouth once daily 90 tablet 2   • Magnesium 250 MG tablet Take  by mouth Every Other Day.     • nortriptyline (PAMELOR) 25 MG capsule Take 1 capsule by mouth Every Night. 30 capsule 1   • vitamin B-12 (CYANOCOBALAMIN) 1000 MCG tablet Take 1,000 mcg by mouth Every Other Day.     • Zinc 50 MG capsule Take  by mouth Every Other Day.       No current facility-administered medications on file prior to visit.          When did you start taking these medications: N/A HAS NOT STARTED TAKING MEDICATION    Which medication are you concerned about: nortriptyline (PAMELOR) 25 MG capsule    Who prescribed you this medication: TORO EMERY MD    What are your concerns: PATIENT CAME IN TO BE TREATED FOR RINGING IN THE EARS BUT UPON READING THIS MEDICATION THE SIDE EFFECT IS RINGING IN THE EARS. PATIENT DOES NOT WANT TO WORSEN HER CONDITION AND IS HESITANT TO TAKE THIS MEDICATION

## 2021-12-31 DIAGNOSIS — E03.9 ACQUIRED HYPOTHYROIDISM: Primary | ICD-10-CM

## 2021-12-31 DIAGNOSIS — H93.12 TINNITUS OF LEFT EAR: ICD-10-CM

## 2021-12-31 RX ORDER — LEVOTHYROXINE SODIUM 88 UG/1
88 TABLET ORAL
Qty: 90 TABLET | Refills: 1 | Status: SHIPPED | OUTPATIENT
Start: 2021-12-31 | End: 2022-04-28 | Stop reason: SDUPTHER

## 2022-01-06 ENCOUNTER — TELEPHONE (OUTPATIENT)
Dept: FAMILY MEDICINE CLINIC | Age: 67
End: 2022-01-06

## 2022-01-06 NOTE — TELEPHONE ENCOUNTER
Caller: Radha Mittal    Relationship to patient: Self    Best call back number: [phone]    Patient is needing: PATIENT CALLED BACK STATING HER  JUST RETURNED BACK AT HOME AND STATED THE MEDICATION WAS NOT EVEN FROM DR EMERY NOR WAS A BLOOD PRESSURE MEDICATION AND IT WAS FROM HER ENT DOCTOR AND TO PLEASE DISREGARD THIS MESSAGE. PLEASE ADVISE THANK YOU.

## 2022-01-06 NOTE — TELEPHONE ENCOUNTER
Caller: Radha Mittal    Relationship: Self    Best call back number: [phone]    What is the best time to reach you: ANYTIME    Who are you requesting to speak with (clinical staff, provider,  specific staff member): FAUZIA    What was the call regarding: PATIENT RECEIVED A TEXT FROM [facility] REGARDING A PRESCRIPTION.   PICKED IT UP AND IT WAS  IS FOR BLOOD PRESSURE MEDICATION.  PATIENT IS NOT SURE WHY SHE RECEIVED THIS BECAUSE NOTHING WAS TALKED ABOUT IN THEIR LAST VISIT. WOULD LIKE TO DISCUSS THIS WITH FAUZIA.     Do you require a callback: YES

## 2022-01-06 NOTE — TELEPHONE ENCOUNTER
Caller: Radha Mittal    Relationship: Self    Best call back number: [phone]     What is the best time to reach you: ANYTIME    Who are you requesting to speak with (clinical staff, provider,  specific staff member): CLINICAL    What was the call regarding: PATIENT CALLED BACK AGAIN AND STATED THAT SHE WAS LOOKING AT THE MEDICATION THE ENT SENT FOR HER AND IT'S HYDRALAZINE 10 MG (1 TABLET BY MOUTH TWICE A DAY) BUT HER PAPER STATES IT'S FOR HIGH BLOOD PRESSURE AND BEFORE SHE TAKES IT SHE WANTS TO KNOW WHAT DR EMERY THINKS ABOUT IT.    Do you require a callback: YES

## 2022-01-11 ENCOUNTER — TELEPHONE (OUTPATIENT)
Dept: FAMILY MEDICINE CLINIC | Age: 67
End: 2022-01-11

## 2022-01-18 ENCOUNTER — TELEPHONE (OUTPATIENT)
Dept: FAMILY MEDICINE CLINIC | Age: 67
End: 2022-01-18

## 2022-01-18 NOTE — TELEPHONE ENCOUNTER
I have sent a refill on sertraline 50 mg to [facility].  I ordered the MRI 2+ weeks ago.  You may want to check on the status of it for the patient.  Thanks.

## 2022-01-18 NOTE — TELEPHONE ENCOUNTER
Caller: Kyrie Radha L    Relationship: Self    Best call back number: [phone]    What is the best time to reach you: ANY    Who are you requesting to speak with (clinical staff, provider,  specific staff member): CLINICAL    What was the call regarding: ENT STATES THAT THERE IS NO REASON FOR HER TO ORDER MRI THAT WAS DISCUSSED. MRI SHOULD BE ORDERED BY PCP. PATIENT WOULD LIKE PLACED BACK ON ZOLOFT. PLEASE CALL AND ADVISE.     [facility] [address] [place] [address], [place] [address] [facility] [phone] [facility] [phone]   [phone]    Do you require a callback: YES

## 2022-01-24 ENCOUNTER — TELEPHONE (OUTPATIENT)
Dept: FAMILY MEDICINE CLINIC | Age: 67
End: 2022-01-24

## 2022-01-24 RX ORDER — DIAZEPAM 5 MG/1
TABLET ORAL
Qty: 1 TABLET | Refills: 0 | Status: SHIPPED | OUTPATIENT
Start: 2022-01-24 | End: 2022-04-28 | Stop reason: SDUPTHER

## 2022-01-24 NOTE — TELEPHONE ENCOUNTER
I sent a single Valium tablet with instructions to [facility].  She may feel free to stop it and have her blood pressure checked here in our allergy room if she would like.  If she is concerned about COVID-19 as it pertains to the taste and smell issue, we could do a PCR test as well.  Thanks.

## 2022-01-24 NOTE — TELEPHONE ENCOUNTER
Caller: Radha Mittal    Relationship: Self    Best call back number: [phone]    What medication are you requesting: ANXIETY MEDICATION     What are your current symptoms: PATIENT IS HAVING AN MRI ON 02.02.2022 AND IS HAVING SOME NERVES. PATIENT IS ON ZOLOFT BUT SHE WOULD LIKE AN ANXIETY MEDICATION TO HELP HER THROUGH HER MRI.    How long have you been experiencing symptoms: N/A     Have you had these symptoms before:    [x] Yes  [] No    Have you been treated for these symptoms before:   [x] Yes  [] No    If a prescription is needed, what is your preferred pharmacy and phone number: [facility] [address] [place], [place] [address] [facility] [phone] [facility] [phone] [facility]     Additional notes:

## 2022-01-24 NOTE — TELEPHONE ENCOUNTER
Caller: Radha Mittal    Relationship to patient: Self    Best call back number: [phone]    Patient is needing: PATIENT HAS BEEN SEEING AN ENT DOCTOR FOR RINGING IN HER EARS. NOW SHE IS TAKING A STEROID PACK AND SHE FEELS LIKE IT'S RAISING HER BLOOD PRESSURE. SHE'S NO LONGER TAKING THE STEROID PACK DUE TO THIS.      PATIENT ALSO WANTED TO LET DOCTOR RUPERT KNOW THAT SHE'S BEEN HAVING AN OFF SENSE OF TASTE AND SMELL RECENTLY. SHE WOULD LIKE TO SPEAK TO CLINICAL STAFF ABOUT THIS.

## 2022-01-25 ENCOUNTER — TELEPHONE (OUTPATIENT)
Dept: FAMILY MEDICINE CLINIC | Age: 67
End: 2022-01-25

## 2022-01-25 NOTE — TELEPHONE ENCOUNTER
Pt states she took the sertraline last night and it caused diarrhea, she has been up since 2am. She would like to know what you recommend from this point. Please advise.

## 2022-01-26 NOTE — TELEPHONE ENCOUNTER
She has been on some other medications as well recently.  I would initially recommend she move the sertraline to morning dosing and see if she tolerates it better.  Frequently, GI related side effects will improve over time.  Let me know if she has other concerns.  Thanks.

## 2022-02-02 ENCOUNTER — HOSPITAL ENCOUNTER (OUTPATIENT)
Dept: MRI IMAGING | Facility: HOSPITAL | Age: 67
Discharge: HOME | End: 2022-02-02
Admitting: FAMILY MEDICINE

## 2022-02-02 DIAGNOSIS — H93.12 TINNITUS OF LEFT EAR: ICD-10-CM

## 2022-02-02 LAB — CREAT BLDA-MCNC: 0.7 MG/DL

## 2022-02-02 PROCEDURE — 70553 MRI BRAIN STEM W/O & W/DYE: CPT

## 2022-02-02 PROCEDURE — 82565 ASSAY OF CREATININE: CPT

## 2022-02-02 PROCEDURE — 0 GADOBENATE DIMEGLUMINE 529 MG/ML SOLUTION: Performed by: FAMILY MEDICINE

## 2022-02-02 PROCEDURE — A9577 INJ MULTIHANCE: HCPCS | Performed by: FAMILY MEDICINE

## 2022-02-02 RX ADMIN — GADOBENATE DIMEGLUMINE 10 ML: 529 INJECTION, SOLUTION INTRAVENOUS at 10:40

## 2022-02-03 ENCOUNTER — PATIENT MESSAGE (OUTPATIENT)
Dept: FAMILY MEDICINE CLINIC | Age: 67
End: 2022-02-03

## 2022-02-03 NOTE — TELEPHONE ENCOUNTER
From: Radha Mittal  To: Edwin Melissa MD  Sent: 2/3/2022 9:40 AM EST  Subject: Medicine     Yes Praise God the MRI was normal and I know you understand about tinnitus being challenging. I do have a followup appointment with Rita Ibanez, ENT for allergy testing on March 4th plus should be getting the new mouth guard for TMJ in a few days hopefully. I wanted to make sure the ENT was sent the information on the MRI? Also, I have a question on the Sertraline versus Hydroxzine? I have been taking the Sertraline in the mornings since January 31st. and no diarrhea thankfully. My sister-in-law took Hydroxzine for a couple months because of panic attacks/anxiety and said it worked great for her. I am just wondering which one you think would be best for me? I do have a bottle of the Hydroxyzine with 1 refill. I have not taken them because I thought they were mainly to help me sleep.

## 2022-02-14 ENCOUNTER — TELEPHONE (OUTPATIENT)
Dept: FAMILY MEDICINE CLINIC | Age: 67
End: 2022-02-14

## 2022-02-14 RX ORDER — FERROUS SULFATE 325(65) MG
325 TABLET ORAL DAILY
Qty: 90 TABLET | Refills: 1 | Status: SHIPPED | OUTPATIENT
Start: 2022-02-14

## 2022-02-14 NOTE — TELEPHONE ENCOUNTER
Caller: MIGUEL RIVERO    Relationship: Emergency Contact    Best call back number: [phone]     What is the medical concern/diagnosis: RINGING IN THE EARS    What specialty or service is being requested: NEUROLOGY      Any additional details: PATIENT'S SPOUSE REQUESTS CALL BACK TO DISCUSS POSSIBLE REFERRAL TO NEUROLOGY FOR PATIENT'S RINGING IN THE EARS..  SMELLS AND NOISE  ARE CAUSING IT 'RAMP UP', AND NOW HAS AN ENHANCED SMELL.

## 2022-02-14 NOTE — TELEPHONE ENCOUNTER
It is okay with me to refer to neurology of choice for second opinion regarding her symptoms.  Please see if they have a preference with regard to neurology and make it so.  Thanks.

## 2022-02-14 NOTE — TELEPHONE ENCOUNTER
Caller: MIGUEL RIVERO    Relationship: Emergency Contact    Best call back number: [phone]    Requested Prescriptions:   Requested Prescriptions     Pending Prescriptions Disp Refills   • ferrous sulfate 325 (65 FE) MG tablet          Pharmacy where request should be sent: [place] [id number] [facility] - [phone]  - [phone] FX       Barak Charlton Rep   02/14/22 10:50 EST

## 2022-02-14 NOTE — TELEPHONE ENCOUNTER
Pt states she has appt with [facility]. [facility] neurologist next week, records faxed.

## 2022-02-21 ENCOUNTER — TELEPHONE (OUTPATIENT)
Dept: FAMILY MEDICINE CLINIC | Age: 67
End: 2022-02-21

## 2022-02-21 NOTE — TELEPHONE ENCOUNTER
Caller: MIGUEL RIVERO    Relationship to patient: Emergency Contact    Best call back number: [phone]    Patient is needing: PATIENTS  CALLED STATING HE WAS SPEAKING WITH [facility] REGARDING THE PATIENTS PAPERWORK TO BE FAXED OVER TO DR QUIROZ OFFICE. HE STATED THEY RECEIVED EVERYTHING BUT THE LAB WORK AND MRI RESULTS. THE PATIENTS  STATED HE IS NEEDING THOSE TO BE FAXED OVER DUE TO THE PATIENT HAVING AN APPT WITH HER NEUROLOGIST TOMORROW. HE STATED HE WOULD LIKE A CALL BACK TO LET HIM KNOW THESE HAVE BEEN SENT PLEASE ADVISE THANK YOU.      [facility]  DR. JULIO WEEKS  [address] [facility]  [address] [place] [id number]     PHONE NUMBER - [phone]  FAX NUMBER - [phone]

## 2022-02-23 ENCOUNTER — TELEPHONE (OUTPATIENT)
Dept: FAMILY MEDICINE CLINIC | Age: 67
End: 2022-02-23

## 2022-02-23 NOTE — TELEPHONE ENCOUNTER
Caller: Radha Mittal    Relationship to patient: Self    Best call back number: [phone]     Patient is needing: PATIENT IS LETTING  KNOW THAT  SAID HE CAN LOOK AT HER NEUROLOGY RESULTS ON [facility] [employer].  PLEASE CALL AND ADVISE.

## 2022-03-01 ENCOUNTER — TELEPHONE (OUTPATIENT)
Dept: FAMILY MEDICINE CLINIC | Age: 67
End: 2022-03-01

## 2022-03-01 ENCOUNTER — LAB (OUTPATIENT)
Dept: LAB | Facility: HOSPITAL | Age: 67
End: 2022-03-01

## 2022-03-01 DIAGNOSIS — E03.9 ACQUIRED HYPOTHYROIDISM: ICD-10-CM

## 2022-03-01 LAB
T4 FREE SERPL-MCNC: 1.69 NG/DL (ref 0.93–1.7)
TSH SERPL DL<=0.05 MIU/L-ACNC: 0.72 UIU/ML (ref 0.27–4.2)

## 2022-03-01 PROCEDURE — 36415 COLL VENOUS BLD VENIPUNCTURE: CPT

## 2022-03-01 PROCEDURE — 84443 ASSAY THYROID STIM HORMONE: CPT

## 2022-03-01 PROCEDURE — 84439 ASSAY OF FREE THYROXINE: CPT

## 2022-03-01 NOTE — TELEPHONE ENCOUNTER
----- Message from Kia Buchanan LPN sent at 1/3/2022  8:57 AM EST -----  Please TICKLE repeat TSH+free T4 in 2 months.

## 2022-03-02 ENCOUNTER — PATIENT MESSAGE (OUTPATIENT)
Dept: FAMILY MEDICINE CLINIC | Age: 67
End: 2022-03-02

## 2022-03-02 NOTE — TELEPHONE ENCOUNTER
From: Radha Mittal  To: Edwin Melissa MD  Sent: 3/2/2022 9:00 AM EST  Subject: Phone call from February 23, 2022    I called on this date and spoke with Elayne and left you a message on this about information from my visit with the neurologist on the 22nd. about you being able to get the information from the visit at [facility]. I just wanted to make sure you were able to view this information?

## 2022-03-14 ENCOUNTER — TELEPHONE (OUTPATIENT)
Dept: FAMILY MEDICINE CLINIC | Age: 67
End: 2022-03-14

## 2022-03-14 ENCOUNTER — TRANSCRIBE ORDERS (OUTPATIENT)
Dept: ADMINISTRATIVE | Facility: HOSPITAL | Age: 67
End: 2022-03-14

## 2022-03-14 DIAGNOSIS — Z78.0 POSTMENOPAUSAL STATE: Primary | ICD-10-CM

## 2022-03-14 DIAGNOSIS — Z12.31 VISIT FOR SCREENING MAMMOGRAM: Primary | ICD-10-CM

## 2022-03-16 ENCOUNTER — PATIENT MESSAGE (OUTPATIENT)
Dept: FAMILY MEDICINE CLINIC | Age: 67
End: 2022-03-16

## 2022-04-28 ENCOUNTER — OFFICE VISIT (OUTPATIENT)
Dept: FAMILY MEDICINE CLINIC | Age: 67
End: 2022-04-28

## 2022-04-28 VITALS
TEMPERATURE: 97.9 F | HEIGHT: 59 IN | HEART RATE: 80 BPM | BODY MASS INDEX: 23.71 KG/M2 | DIASTOLIC BLOOD PRESSURE: 78 MMHG | SYSTOLIC BLOOD PRESSURE: 157 MMHG | WEIGHT: 117.6 LBS

## 2022-04-28 DIAGNOSIS — F41.8 OTHER SPECIFIED ANXIETY DISORDERS: ICD-10-CM

## 2022-04-28 DIAGNOSIS — Z00.00 PHYSICAL EXAM: Primary | ICD-10-CM

## 2022-04-28 DIAGNOSIS — E03.9 ACQUIRED HYPOTHYROIDISM: ICD-10-CM

## 2022-04-28 DIAGNOSIS — D51.9 ANEMIA DUE TO VITAMIN B12 DEFICIENCY, UNSPECIFIED B12 DEFICIENCY TYPE: ICD-10-CM

## 2022-04-28 DIAGNOSIS — E78.00 HYPERCHOLESTEROLEMIA: ICD-10-CM

## 2022-04-28 PROCEDURE — G0439 PPPS, SUBSEQ VISIT: HCPCS | Performed by: FAMILY MEDICINE

## 2022-04-28 PROCEDURE — 99214 OFFICE O/P EST MOD 30 MIN: CPT | Performed by: FAMILY MEDICINE

## 2022-04-28 PROCEDURE — 96160 PT-FOCUSED HLTH RISK ASSMT: CPT | Performed by: FAMILY MEDICINE

## 2022-04-28 PROCEDURE — 1159F MED LIST DOCD IN RCRD: CPT | Performed by: FAMILY MEDICINE

## 2022-04-28 PROCEDURE — 1126F AMNT PAIN NOTED NONE PRSNT: CPT | Performed by: FAMILY MEDICINE

## 2022-04-28 PROCEDURE — 1170F FXNL STATUS ASSESSED: CPT | Performed by: FAMILY MEDICINE

## 2022-04-28 RX ORDER — LEVOTHYROXINE SODIUM 88 UG/1
88 TABLET ORAL
Qty: 90 TABLET | Refills: 1 | Status: SHIPPED | OUTPATIENT
Start: 2022-04-28 | End: 2022-08-01

## 2022-04-28 RX ORDER — SERTRALINE HYDROCHLORIDE 100 MG/1
100 TABLET, FILM COATED ORAL DAILY
Qty: 90 TABLET | Refills: 1 | Status: SHIPPED | OUTPATIENT
Start: 2022-04-28

## 2022-04-28 RX ORDER — PROPRANOLOL HYDROCHLORIDE 10 MG/1
10 TABLET ORAL DAILY
COMMUNITY
Start: 2022-02-22 | End: 2022-05-09

## 2022-04-28 NOTE — PROGRESS NOTES
The ABCs of the Annual Wellness Visit  Subsequent Medicare Wellness Visit    Chief Complaint:  Medicare wellness exam, blood pressure, thyroid, anxiety    Subjective    History of Present Illness:  Radha Mittal is a 67 y.o. female who presents for a Subsequent Medicare Wellness Visit.    The following portions of the patient's history were reviewed and updated as appropriate: allergies, current medications, past family history, past medical history, past social history, past surgical history and problem list.     Compared to one year ago, the patient feels her physical health is worse.  Due to tinnitus.    Compared to one year ago, the patient feels her mental health is worse.  Due to tinnitus.    Recent Hospitalizations:  She was not admitted to the hospital during the last year.       Current Medical Providers:  Patient Care Team:  Edwin Melissa MD as PCP - General (Family Medicine)    Outpatient Medications Prior to Visit   Medication Sig Dispense Refill   • aspirin 81 MG EC tablet Aspir-81 81 mg oral tablet,delayed release (DR/EC) take 1 tablet (81 mg) by oral route once daily   Active     • calcium carbonate (OS-DIANA) 1250 (500 Ca) MG tablet Calcium 500 500 mg calcium (1,250 mg) oral tablet take 1 tablet by oral route daily   Active     • Cholecalciferol 25 MCG (1000 UT) capsule Vitamin D3 1,000 unit oral capsule take 1 capsule by oral route daily   Active     • ferrous sulfate 325 (65 FE) MG tablet Take 1 tablet by mouth Daily. 90 tablet 1   • Magnesium 250 MG tablet Take  by mouth Every Other Day.     • propranolol (INDERAL) 10 MG tablet Take 10 mg by mouth Daily.     • vitamin B-12 (CYANOCOBALAMIN) 1000 MCG tablet Take 1,000 mcg by mouth Every Other Day.     • hydrOXYzine (ATARAX) 25 MG tablet Take 1 tablet by mouth Every 8 (Eight) Hours As Needed for Anxiety (insomnia). 30 tablet 1   • levothyroxine (SYNTHROID, LEVOTHROID) 88 MCG tablet Take 1 tablet by mouth Every Morning. 90 tablet 1   •  sertraline (ZOLOFT) 50 MG tablet Take 1 tablet by mouth Daily. 90 tablet 1   • diazePAM (Valium) 5 MG tablet Take 1/2 tablet approximately 30 minutes before procedure.  May take the other half tablet at the time of procedure if needed. 1 tablet 0   • fluticasone (Flonase) 50 MCG/ACT nasal spray 2 sprays into the nostril(s) as directed by provider Daily. 48 mL 3   • Zinc 50 MG capsule Take  by mouth Every Other Day.       No facility-administered medications prior to visit.       No opioid medication identified on active medication list. I have reviewed chart for other potential  high risk medication/s and harmful drug interactions in the elderly.          Aspirin is on active medication list. Aspirin use is indicated based on review of current medical condition/s. Pros and cons of this therapy have been discussed today. Benefits of this medication outweigh potential harm.  Patient has been encouraged to continue taking this medication.  .      Patient Active Problem List   Diagnosis   • Hypercholesterolemia   • Hypothyroidism   • Rash   • Vitamin B12 deficiency anemia     Advance Care Planning   Advance Directive is on file.  ACP discussion was held with the patient during this visit. Patient has an advance directive in EMR which is still valid. Her , [place], would make decisions if needed.      Radha is also here for follow-up on her usual care.  She has hypertension and remains on propranolol.  She was given this in part for headache and tinnitus.  However, it does not seem to have impacted those issues.  She does have high blood pressure that seems to be an adequately controlled at this time.  She brings in a log of her blood pressures.  Her pressure has been running in the 110s, 120s, and 130s at home.  She is tolerating her medication well.    She also has hypothyroidism.  Her TSH was checked about 2 months ago and was optimal.  She continues to have some issue with hair loss that concerns her  "though.    She also has had a significant issue with anxiety earlier in the year which was at least in part due to the issue with tinnitus.  She was placed on sertraline and is currently taking 75 mg daily of it.  It has been helpful for her, but she is asking about whether we should consider stopping it at this point.      Review of Systems:  Review of Systems   Constitutional: Negative for chills and fever.   Respiratory: Negative for cough and shortness of breath.    Cardiovascular: Negative for chest pain and palpitations.   Gastrointestinal: Negative for abdominal pain, nausea and vomiting.         Objective       Vitals:    04/28/22 0855   BP: 155/89   BP Location: Right arm   Patient Position: Sitting   Pulse: 86   Temp: 97.9 °F (36.6 °C)   TempSrc: Oral   Weight: 53.3 kg (117 lb 9.6 oz)   Height: 149.9 cm (59.02\")   PainSc: 0-No pain     BMI Readings from Last 1 Encounters:   04/28/22 23.74 kg/m²   BMI is within normal parameters. No follow-up required.    Does the patient have evidence of cognitive impairment? No    Physical Exam  Vitals and nursing note reviewed.   Constitutional:       General: She is not in acute distress.     Appearance: She is not ill-appearing.   HENT:      Right Ear: Tympanic membrane and ear canal normal.      Left Ear: Tympanic membrane and ear canal normal.      Ears:      Comments: Hearing is normal bilaterally with forced whisper.     Nose:      Comments: There is an opening in the nasal septum.  No masses noted in the nostrils.     Mouth/Throat:      Mouth: Mucous membranes are moist.      Comments: Pharynx appears normal  Eyes:      Extraocular Movements: Extraocular movements intact.      Pupils: Pupils are equal, round, and reactive to light.      Comments: Binocular vision is 20/20 with correction.   Neck:      Thyroid: No thyromegaly.   Cardiovascular:      Rate and Rhythm: Normal rate and regular rhythm.      Heart sounds: No murmur heard.  Pulmonary:      Effort: " Pulmonary effort is normal.      Breath sounds: Normal breath sounds.   Abdominal:      General: There is no distension.      Palpations: Abdomen is soft. There is no mass.      Tenderness: There is no abdominal tenderness.   Musculoskeletal:      Cervical back: Normal range of motion.   Skin:     Findings: No lesion or rash.   Neurological:      General: No focal deficit present.      Mental Status: She is oriented to person, place, and time.      Cranial Nerves: No cranial nerve deficit.   Psychiatric:         Mood and Affect: Mood normal.       The following data was reviewed by Edwin Melissa MD on 04/28/2022.  Lab Results   Component Value Date    WBC 8.11 12/30/2021    HGB 14.7 12/30/2021    HCT 46.5 12/30/2021    MCV 80.7 12/30/2021     12/30/2021     Lab Results   Component Value Date    GLUCOSE 105 (H) 12/30/2021    BUN 5 (L) 12/30/2021    CREATININE 0.70 02/02/2022     12/30/2021    K 3.9 12/30/2021     12/30/2021    CO2 29.2 (H) 12/30/2021    CALCIUM 9.4 12/30/2021    PROTEINTOT 7.3 12/30/2021    ALBUMIN 4.90 12/30/2021    ALT 12 12/30/2021    AST 14 12/30/2021    ALKPHOS 47 12/30/2021    BILITOT 0.2 12/30/2021    EGFRIFNONA 97 12/30/2021    GLOB 2.4 12/30/2021    AGRATIO 2.0 12/30/2021    BCR 8.1 12/30/2021    ANIONGAP 7.8 12/30/2021      Lab Results   Component Value Date    CHLPL 218 (H) 04/26/2021    TRIG 95 04/26/2021    HDL 70 (H) 04/26/2021     (H) 04/26/2021     Lab Results   Component Value Date    TSH 0.720 03/01/2022        HEALTH RISK ASSESSMENT    Smoking Status:  Social History     Tobacco Use   Smoking Status Never Smoker   Smokeless Tobacco Never Used     Alcohol Consumption:  Social History     Substance and Sexual Activity   Alcohol Use Not Currently     Fall Risk Screen:    STEADI Fall Risk Assessment has not been completed.    Depression Screening:  PHQ-2/PHQ-9 Depression Screening 12/8/2021   Retired PHQ-9 Total Score 0   Retired Total Score 0        Health Habits and Functional and Cognitive Screening:  Functional & Cognitive Status 4/28/2022   Do you have difficulty preparing food and eating? No   Do you have difficulty bathing yourself, getting dressed or grooming yourself? No   Do you have difficulty using the toilet? No   Do you have difficulty moving around from place to place? No   Do you have trouble with steps or getting out of a bed or a chair? No   Current Diet Well Balanced Diet   Dental Exam Up to date   Eye Exam Up to date   Exercise (times per week) 2 times per week   Current Exercises Include Walking   Do you need help using the phone?  No   Are you deaf or do you have serious difficulty hearing?  No   Do you need help with transportation? No   Do you need help shopping? No   Do you need help preparing meals?  No   Do you need help with housework?  No   Do you need help with laundry? No   Do you need help taking your medications? No   Do you need help managing money? No   Do you ever drive or ride in a car without wearing a seat belt? No   Have you felt unusual stress, anger or loneliness in the last month? No   Who do you live with? Spouse   If you need help, do you have trouble finding someone available to you? No   Have you been bothered in the last four weeks by sexual problems? No   Do you have difficulty concentrating, remembering or making decisions? No       Age-appropriate Screening Schedule:  Refer to the list below for future screening recommendations based on patient's age, sex and/or medical conditions. Orders for these recommended tests are listed in the plan section. The patient has been provided with a written plan.    Health Maintenance   Topic Date Due   • DXA SCAN  Never done   • TDAP/TD VACCINES (1 - Tdap) Never done   • LIPID PANEL  04/26/2022   • INFLUENZA VACCINE  08/01/2022   • MAMMOGRAM  05/03/2023   • ZOSTER VACCINE  Completed                       Assessment and Plan:       CMS Preventative Services Quick  Reference  Risk Factors Identified During Encounter  Cardiovascular Disease  Depression/Dysphoria  Immunizations Discussed/Encouraged (specific Immunizations; Tdap, Influenza, Pneumococcal 23 and COVID19)    The above risks/problems have been discussed with the patient.  Follow up actions/plans if indicated are seen below in the Assessment/Plan Section.  Pertinent information has been shared with the patient in the After Visit Summary.    Radha remains in excellent overall health.  She is mostly up-to-date on needed screenings.  She probably is up-to-date on colonoscopy, but I do want to review her old records from [facility].  We will be back in touch with her about my guidance on that.  We also discussed immunizations today.  She is resistant to additional immunizations at this time.  Mammogram and DEXA are pending.  We will refill needed medications as below and contact her in late June or July for blood work.  With regard to the anxiety, she has seen improvement with the use of sertraline.  Simply for convenience, we will change her dose to 100 mg daily.  I would advise she stay on that at least until the first of the year, and possibly until next brain.  Her blood pressure will be rechecked today.  It remains an open question whether we should consider a change from propranolol to a different medication.  We will plan to see her back in about 6 months for recheck on these things.    Diagnoses and all orders for this visit:    1. Physical exam (Primary)    2. Acquired hypothyroidism  -     TSH; Future  -     levothyroxine (SYNTHROID, LEVOTHROID) 88 MCG tablet; Take 1 tablet by mouth Every Morning.  Dispense: 90 tablet; Refill: 1    3. Hypercholesterolemia  -     Comprehensive Metabolic Panel; Future  -     Lipid Panel; Future    4. Anemia due to vitamin B12 deficiency, unspecified B12 deficiency type  -     CBC (No Diff); Future  -     Vitamin B12 & Folate; Future    5. Other specified anxiety disorders  -      sertraline (ZOLOFT) 100 MG tablet; Take 1 tablet by mouth Daily.  Dispense: 90 tablet; Refill: 1        Follow Up:   Return in about 6 months (around 10/28/2022) for Recheck.     An After Visit Summary and PPPS were given to the patient.

## 2022-05-05 ENCOUNTER — APPOINTMENT (OUTPATIENT)
Dept: MAMMOGRAPHY | Facility: HOSPITAL | Age: 67
End: 2022-05-05

## 2022-05-09 ENCOUNTER — PATIENT MESSAGE (OUTPATIENT)
Dept: FAMILY MEDICINE CLINIC | Age: 67
End: 2022-05-09

## 2022-05-09 ENCOUNTER — HOSPITAL ENCOUNTER (OUTPATIENT)
Dept: MAMMOGRAPHY | Facility: HOSPITAL | Age: 67
Discharge: HOME | End: 2022-05-09

## 2022-05-09 ENCOUNTER — HOSPITAL ENCOUNTER (OUTPATIENT)
Dept: BONE DENSITY | Facility: HOSPITAL | Age: 67
Discharge: HOME | End: 2022-05-09

## 2022-05-09 DIAGNOSIS — Z78.0 POSTMENOPAUSAL STATE: ICD-10-CM

## 2022-05-09 DIAGNOSIS — Z12.31 VISIT FOR SCREENING MAMMOGRAM: ICD-10-CM

## 2022-05-09 PROCEDURE — 77067 SCR MAMMO BI INCL CAD: CPT

## 2022-05-09 PROCEDURE — 77080 DXA BONE DENSITY AXIAL: CPT

## 2022-05-09 PROCEDURE — 77063 BREAST TOMOSYNTHESIS BI: CPT

## 2022-05-09 RX ORDER — METOPROLOL SUCCINATE 25 MG/1
25 TABLET, EXTENDED RELEASE ORAL NIGHTLY
Qty: 90 TABLET | Refills: 1 | Status: SHIPPED | OUTPATIENT
Start: 2022-05-09 | End: 2022-10-31 | Stop reason: SDUPTHER

## 2022-05-19 ENCOUNTER — TELEPHONE (OUTPATIENT)
Dept: GASTROENTEROLOGY | Facility: CLINIC | Age: 67
End: 2022-05-19

## 2022-05-19 NOTE — TELEPHONE ENCOUNTER
Pt called and would like to schedule CLS with Dr. De La Torre. She is a former [facility] pt and her [facility] records are in the Box. Please advise.

## 2022-05-31 ENCOUNTER — PREP FOR SURGERY (OUTPATIENT)
Dept: SURGERY | Facility: SURGERY CENTER | Age: 67
End: 2022-05-31

## 2022-05-31 DIAGNOSIS — Z12.11 ENCOUNTER FOR SCREENING FOR MALIGNANT NEOPLASM OF COLON: ICD-10-CM

## 2022-05-31 DIAGNOSIS — Z86.010 HISTORY OF COLON POLYPS: Primary | ICD-10-CM

## 2022-06-01 PROBLEM — K59.00 CONSTIPATION: Status: ACTIVE | Noted: 2022-06-01

## 2022-06-01 PROBLEM — Z12.11 ENCOUNTER FOR SCREENING FOR MALIGNANT NEOPLASM OF COLON: Status: ACTIVE | Noted: 2022-06-01

## 2022-06-01 RX ORDER — SODIUM CHLORIDE, SODIUM LACTATE, POTASSIUM CHLORIDE, CALCIUM CHLORIDE 600; 310; 30; 20 MG/100ML; MG/100ML; MG/100ML; MG/100ML
30 INJECTION, SOLUTION INTRAVENOUS CONTINUOUS PRN
OUTPATIENT
Start: 2022-06-01

## 2022-06-01 RX ORDER — SODIUM CHLORIDE 0.9 % (FLUSH) 0.9 %
3 SYRINGE (ML) INJECTION EVERY 12 HOURS SCHEDULED
Status: CANCELLED | OUTPATIENT
Start: 2022-06-01

## 2022-06-01 RX ORDER — SODIUM CHLORIDE 0.9 % (FLUSH) 0.9 %
10 SYRINGE (ML) INJECTION AS NEEDED
Status: CANCELLED | OUTPATIENT
Start: 2022-06-01

## 2022-06-23 ENCOUNTER — TELEPHONE (OUTPATIENT)
Dept: GASTROENTEROLOGY | Facility: CLINIC | Age: 67
End: 2022-06-23

## 2022-06-23 NOTE — TELEPHONE ENCOUNTER
Returned patient call and answered all questions that had previously been answered by Tiffanie Kelsey

## 2022-06-29 ENCOUNTER — TELEPHONE (OUTPATIENT)
Dept: FAMILY MEDICINE CLINIC | Age: 67
End: 2022-06-29

## 2022-06-29 NOTE — TELEPHONE ENCOUNTER
----- Message from Estrella Murillo LPN sent at 4/28/2022  9:56 AM EDT -----  Labs-order in chart

## 2022-07-26 ENCOUNTER — LAB (OUTPATIENT)
Dept: LAB | Facility: HOSPITAL | Age: 67
End: 2022-07-26

## 2022-07-26 DIAGNOSIS — E78.00 HYPERCHOLESTEROLEMIA: ICD-10-CM

## 2022-07-26 DIAGNOSIS — E03.9 ACQUIRED HYPOTHYROIDISM: ICD-10-CM

## 2022-07-26 DIAGNOSIS — D51.9 ANEMIA DUE TO VITAMIN B12 DEFICIENCY, UNSPECIFIED B12 DEFICIENCY TYPE: ICD-10-CM

## 2022-07-26 LAB
ALBUMIN SERPL-MCNC: 4.3 G/DL (ref 3.5–5.2)
ALBUMIN/GLOB SERPL: 2 G/DL
ALP SERPL-CCNC: 50 U/L (ref 39–117)
ALT SERPL W P-5'-P-CCNC: 15 U/L (ref 1–33)
ANION GAP SERPL CALCULATED.3IONS-SCNC: 11 MMOL/L (ref 5–15)
AST SERPL-CCNC: 15 U/L (ref 1–32)
BILIRUB SERPL-MCNC: 0.3 MG/DL (ref 0–1.2)
BUN SERPL-MCNC: 9 MG/DL (ref 8–23)
BUN/CREAT SERPL: 14.8 (ref 7–25)
CALCIUM SPEC-SCNC: 8.9 MG/DL (ref 8.6–10.5)
CHLORIDE SERPL-SCNC: 104 MMOL/L (ref 98–107)
CHOLEST SERPL-MCNC: 197 MG/DL (ref 0–200)
CO2 SERPL-SCNC: 27 MMOL/L (ref 22–29)
CREAT SERPL-MCNC: 0.61 MG/DL (ref 0.57–1)
DEPRECATED RDW RBC AUTO: 45.1 FL (ref 37–54)
EGFRCR SERPLBLD CKD-EPI 2021: 98.7 ML/MIN/1.73
ERYTHROCYTE [DISTWIDTH] IN BLOOD BY AUTOMATED COUNT: 15 % (ref 12.3–15.4)
FOLATE SERPL-MCNC: >20 NG/ML (ref 4.78–24.2)
GLOBULIN UR ELPH-MCNC: 2.2 GM/DL
GLUCOSE SERPL-MCNC: 84 MG/DL (ref 65–99)
HCT VFR BLD AUTO: 43.2 % (ref 34–46.6)
HDLC SERPL-MCNC: 52 MG/DL (ref 40–60)
HGB BLD-MCNC: 13.1 G/DL (ref 12–15.9)
LDLC SERPL CALC-MCNC: 124 MG/DL (ref 0–100)
LDLC/HDLC SERPL: 2.35 {RATIO}
MCH RBC QN AUTO: 24.7 PG (ref 26.6–33)
MCHC RBC AUTO-ENTMCNC: 30.3 G/DL (ref 31.5–35.7)
MCV RBC AUTO: 81.5 FL (ref 79–97)
PLATELET # BLD AUTO: 319 10*3/MM3 (ref 140–450)
PMV BLD AUTO: 9.1 FL (ref 6–12)
POTASSIUM SERPL-SCNC: 3.9 MMOL/L (ref 3.5–5.2)
PROT SERPL-MCNC: 6.5 G/DL (ref 6–8.5)
RBC # BLD AUTO: 5.3 10*6/MM3 (ref 3.77–5.28)
SODIUM SERPL-SCNC: 142 MMOL/L (ref 136–145)
TRIGL SERPL-MCNC: 115 MG/DL (ref 0–150)
TSH SERPL DL<=0.05 MIU/L-ACNC: 0.02 UIU/ML (ref 0.27–4.2)
VIT B12 BLD-MCNC: 1115 PG/ML (ref 211–946)
VLDLC SERPL-MCNC: 21 MG/DL (ref 5–40)
WBC NRBC COR # BLD: 6.28 10*3/MM3 (ref 3.4–10.8)

## 2022-07-26 PROCEDURE — 85027 COMPLETE CBC AUTOMATED: CPT

## 2022-07-26 PROCEDURE — 82746 ASSAY OF FOLIC ACID SERUM: CPT

## 2022-07-26 PROCEDURE — 82607 VITAMIN B-12: CPT

## 2022-07-26 PROCEDURE — 80053 COMPREHEN METABOLIC PANEL: CPT

## 2022-07-26 PROCEDURE — 80061 LIPID PANEL: CPT

## 2022-07-26 PROCEDURE — 36415 COLL VENOUS BLD VENIPUNCTURE: CPT

## 2022-07-26 PROCEDURE — 84443 ASSAY THYROID STIM HORMONE: CPT

## 2022-08-01 DIAGNOSIS — E03.9 ACQUIRED HYPOTHYROIDISM: ICD-10-CM

## 2022-08-01 RX ORDER — LEVOTHYROXINE SODIUM 0.07 MG/1
75 TABLET ORAL
Qty: 90 TABLET | Refills: 1 | Status: SHIPPED | OUTPATIENT
Start: 2022-08-01 | End: 2022-10-31 | Stop reason: SDUPTHER

## 2022-08-23 NOTE — SIGNIFICANT NOTE
Education provided the Patient on the following:    - Nothing to Eat or Drink after MN the night before the procedure    - Avoid red/purple fluids while completing their bowel prep as ordered by physician  -Contact Gastrointerologist office for any questions about specific details regarding colon prep    -You will need to have someone drive you home after your colonoscopy and remain with you for 24 hours after the procedure  - The date of your Surgery, you may have one visitor at bedside or within 10-15 minutes of [facility]  -Please wear warm socks when you arrive for your colonoscopy  -Remove all jewelry and leave any valuables before arriving the day of your procedure (all will have to be removed before leaving preop)  -You will need to arrive at 1100  on 8/26 for your colonoscopy    -Feel free to contact us at: [phone] with any additional questions/concerns

## 2022-08-24 ENCOUNTER — PREP FOR SURGERY (OUTPATIENT)
Dept: SURGERY | Facility: SURGERY CENTER | Age: 67
End: 2022-08-24

## 2022-08-24 DIAGNOSIS — Z12.11 ENCOUNTER FOR SCREENING FOR MALIGNANT NEOPLASM OF COLON: Primary | ICD-10-CM

## 2022-08-24 RX ORDER — SODIUM CHLORIDE 0.9 % (FLUSH) 0.9 %
10 SYRINGE (ML) INJECTION AS NEEDED
Status: CANCELLED | OUTPATIENT
Start: 2022-08-24

## 2022-08-24 RX ORDER — SODIUM CHLORIDE, SODIUM LACTATE, POTASSIUM CHLORIDE, CALCIUM CHLORIDE 600; 310; 30; 20 MG/100ML; MG/100ML; MG/100ML; MG/100ML
30 INJECTION, SOLUTION INTRAVENOUS CONTINUOUS PRN
Status: CANCELLED | OUTPATIENT
Start: 2022-08-24

## 2022-08-24 RX ORDER — SODIUM CHLORIDE 0.9 % (FLUSH) 0.9 %
3 SYRINGE (ML) INJECTION EVERY 12 HOURS SCHEDULED
Status: CANCELLED | OUTPATIENT
Start: 2022-08-24

## 2022-08-24 NOTE — SIGNIFICANT NOTE
Education provided the Patient on the following:    - Nothing to Eat or Drink after MN the night before the procedure    - Avoid red/purple fluids while completing their bowel prep as ordered by physician  -Contact Gastrointerologist office for any questions about specific details regarding colon prep    -You will need to have someone drive you home after your colonoscopy and remain with you for 24 hours after the procedure  - The date of your Surgery, you may have one visitor at bedside or within 10-15 minutes of [facility]  -Please wear warm socks when you arrive for your colonoscopy  -Remove all jewelry and leave any valuables before arriving the day of your procedure (all will have to be removed before leaving preop)  -You will need to arrive at 1100 on 8/26/22 for your colonoscopy    -Feel free to contact us at: [phone] with any additional questions/concerns

## 2022-08-26 ENCOUNTER — ANESTHESIA EVENT (OUTPATIENT)
Dept: SURGERY | Facility: SURGERY CENTER | Age: 67
End: 2022-08-26

## 2022-08-26 ENCOUNTER — ANESTHESIA (OUTPATIENT)
Dept: SURGERY | Facility: SURGERY CENTER | Age: 67
End: 2022-08-26

## 2022-08-26 ENCOUNTER — HOSPITAL ENCOUNTER (OUTPATIENT)
Facility: SURGERY CENTER | Age: 67
Setting detail: HOSPITAL OUTPATIENT SURGERY
Discharge: HOME | End: 2022-08-26
Attending: INTERNAL MEDICINE | Admitting: INTERNAL MEDICINE

## 2022-08-26 VITALS
RESPIRATION RATE: 16 BRPM | WEIGHT: 121 LBS | OXYGEN SATURATION: 100 % | HEIGHT: 59 IN | DIASTOLIC BLOOD PRESSURE: 75 MMHG | SYSTOLIC BLOOD PRESSURE: 101 MMHG | HEART RATE: 68 BPM | TEMPERATURE: 97.5 F | BODY MASS INDEX: 24.39 KG/M2

## 2022-08-26 DIAGNOSIS — Z12.11 ENCOUNTER FOR SCREENING FOR MALIGNANT NEOPLASM OF COLON: ICD-10-CM

## 2022-08-26 PROCEDURE — G0105 COLORECTAL SCRN; HI RISK IND: HCPCS | Performed by: INTERNAL MEDICINE

## 2022-08-26 PROCEDURE — 25010000002 PROPOFOL 10 MG/ML EMULSION: Performed by: ANESTHESIOLOGY

## 2022-08-26 RX ORDER — SODIUM CHLORIDE 0.9 % (FLUSH) 0.9 %
3 SYRINGE (ML) INJECTION EVERY 12 HOURS SCHEDULED
Status: DISCONTINUED | OUTPATIENT
Start: 2022-08-26 | End: 2022-08-26 | Stop reason: HOSPADM

## 2022-08-26 RX ORDER — PROPOFOL 10 MG/ML
VIAL (ML) INTRAVENOUS AS NEEDED
Status: DISCONTINUED | OUTPATIENT
Start: 2022-08-26 | End: 2022-08-26 | Stop reason: SURG

## 2022-08-26 RX ORDER — SODIUM CHLORIDE 0.9 % (FLUSH) 0.9 %
10 SYRINGE (ML) INJECTION AS NEEDED
Status: DISCONTINUED | OUTPATIENT
Start: 2022-08-26 | End: 2022-08-26 | Stop reason: HOSPADM

## 2022-08-26 RX ORDER — LIDOCAINE HYDROCHLORIDE 20 MG/ML
INJECTION, SOLUTION INFILTRATION; PERINEURAL AS NEEDED
Status: DISCONTINUED | OUTPATIENT
Start: 2022-08-26 | End: 2022-08-26 | Stop reason: SURG

## 2022-08-26 RX ORDER — MAGNESIUM HYDROXIDE 1200 MG/15ML
LIQUID ORAL AS NEEDED
Status: DISCONTINUED | OUTPATIENT
Start: 2022-08-26 | End: 2022-08-26 | Stop reason: HOSPADM

## 2022-08-26 RX ORDER — PROMETHAZINE HYDROCHLORIDE 25 MG/1
25 SUPPOSITORY RECTAL ONCE AS NEEDED
Status: DISCONTINUED | OUTPATIENT
Start: 2022-08-26 | End: 2022-08-26 | Stop reason: HOSPADM

## 2022-08-26 RX ORDER — PROMETHAZINE HYDROCHLORIDE 12.5 MG/1
25 TABLET ORAL ONCE AS NEEDED
Status: DISCONTINUED | OUTPATIENT
Start: 2022-08-26 | End: 2022-08-26 | Stop reason: HOSPADM

## 2022-08-26 RX ORDER — SODIUM CHLORIDE, SODIUM LACTATE, POTASSIUM CHLORIDE, CALCIUM CHLORIDE 600; 310; 30; 20 MG/100ML; MG/100ML; MG/100ML; MG/100ML
30 INJECTION, SOLUTION INTRAVENOUS CONTINUOUS PRN
Status: DISCONTINUED | OUTPATIENT
Start: 2022-08-26 | End: 2022-08-26 | Stop reason: HOSPADM

## 2022-08-26 RX ADMIN — SODIUM CHLORIDE, SODIUM LACTATE, POTASSIUM CHLORIDE, AND CALCIUM CHLORIDE 30 ML/HR: .6; .31; .03; .02 INJECTION, SOLUTION INTRAVENOUS at 11:30

## 2022-08-26 RX ADMIN — PROPOFOL 80 MG: 10 INJECTION, EMULSION INTRAVENOUS at 12:06

## 2022-08-26 RX ADMIN — PROPOFOL 140 MCG/KG/MIN: 10 INJECTION, EMULSION INTRAVENOUS at 12:07

## 2022-08-26 RX ADMIN — LIDOCAINE HYDROCHLORIDE 60 MG: 20 INJECTION, SOLUTION INFILTRATION; PERINEURAL at 12:05

## 2022-08-26 NOTE — H&P
No chief complaint on file.      HPI  screening         Problem List:    Patient Active Problem List   Diagnosis   • Hypercholesterolemia   • Hypothyroidism   • Rash   • Vitamin B12 deficiency anemia   • Constipation   • Encounter for screening for malignant neoplasm of colon       Medical History:    Past Medical History:   Diagnosis Date   • Disease of thyroid gland    • Diverticulosis    • Hemorrhoids    • Hypertension    • Polyp of hepatic flexure of colon         Social History:    Social History     Socioeconomic History   • Marital status:    Tobacco Use   • Smoking status: Never Smoker   • Smokeless tobacco: Never Used   Vaping Use   • Vaping Use: Never used   Substance and Sexual Activity   • Alcohol use: Not Currently   • Drug use: Never   • Sexual activity: Defer       Family History:   Family History   Family history unknown: Yes       Surgical History:   Past Surgical History:   Procedure Laterality Date   • CARDIAC ABLATION     •  SECTION      x2   • COLONOSCOPY W/ BIOPSIES AND POLYPECTOMY         No current facility-administered medications for this encounter.    Current Outpatient Medications:   •  aspirin 81 MG EC tablet, Aspir-81 81 mg oral tablet,delayed release (DR/EC) take 1 tablet (81 mg) by oral route once daily   Active, Disp: , Rfl:   •  calcium carbonate (OS-DIANA) 1250 (500 Ca) MG tablet, Calcium 500 500 mg calcium (1,250 mg) oral tablet take 1 tablet by oral route daily   Active, Disp: , Rfl:   •  Cholecalciferol 25 MCG (1000 UT) capsule, Vitamin D3 1,000 unit oral capsule take 1 capsule by oral route daily   Active, Disp: , Rfl:   •  ferrous sulfate 325 (65 FE) MG tablet, Take 1 tablet by mouth Daily., Disp: 90 tablet, Rfl: 1  •  levothyroxine (SYNTHROID, LEVOTHROID) 75 MCG tablet, Take 1 tablet by mouth Every Morning., Disp: 90 tablet, Rfl: 1  •  Magnesium 250 MG tablet, Take  by mouth Every Other Day., Disp: , Rfl:   •  metoprolol succinate XL (TOPROL-XL) 25 MG 24 hr  tablet, Take 1 tablet by mouth Every Night., Disp: 90 tablet, Rfl: 1  •  sertraline (ZOLOFT) 100 MG tablet, Take 1 tablet by mouth Daily., Disp: 90 tablet, Rfl: 1  •  vitamin B-12 (CYANOCOBALAMIN) 1000 MCG tablet, Take 1,000 mcg by mouth Every Other Day., Disp: , Rfl:     Allergies:   Allergies   Allergen Reactions   • Latex Rash   • Sulfa Antibiotics Unknown - Low Severity        The following portions of the patient's history were reviewed by me and updated as appropriate: review of systems, allergies, current medications, past family history, past medical history, past social history, past surgical history and problem list.    There were no vitals filed for this visit.    PHYSICAL EXAM:    CONSTITUTIONAL:  today's vital signs reviewed by me  GASTROINTESTINAL: abdomen is soft nontender nondistended with normal active bowel sounds, no masses are appreciated    Assessment/ Plan  Screening    colonoscopy    Risks and benefits as well as alternatives to endoscopic evaluation were explained to the patient and they voiced understanding and wish to proceed.  These risks include but are not limited to the risk of bleeding, perforation, adverse reaction to sedation, and missed lesions.  The patient was given the opportunity to ask questions prior to the endoscopic procedure.

## 2022-08-26 NOTE — ANESTHESIA PREPROCEDURE EVALUATION
Anesthesia Evaluation     Patient summary reviewed and Nursing notes reviewed   no history of anesthetic complications:  NPO Solid Status: > 8 hours  NPO Liquid Status: > 8 hours           Airway   Mallampati: II  TM distance: >3 FB  Neck ROM: full  No difficulty expected  Dental - normal exam     Pulmonary    (-) rhonchi, decreased breath sounds, wheezes, not a smoker  Cardiovascular   Exercise tolerance: good (4-7 METS)    Rhythm: regular  Rate: normal    (+) hypertension, hyperlipidemia,   (-) CAD, angina, PERAZA, murmur      Neuro/Psych  (-) CVA  GI/Hepatic/Renal/Endo    (+)   thyroid problem hypothyroidism  (-) no renal disease, diabetes    Musculoskeletal     Abdominal     Abdomen: soft.   Substance History      OB/GYN          Other                        Anesthesia Plan    ASA 2     general   total IV anesthesia  intravenous induction     Anesthetic plan, risks, benefits, and alternatives have been provided, discussed and informed consent has been obtained with: patient.        CODE STATUS:

## 2022-08-26 NOTE — ANESTHESIA POSTPROCEDURE EVALUATION
"Patient: Radha Mittal    Procedure Summary     Date: 08/26/22 Room / Location: [facility]    Anesthesia Start: 1202 Anesthesia Stop: 1223    Procedure: COLONOSCOPY (N/A ) Diagnosis:       Encounter for screening for malignant neoplasm of colon      (Encounter for screening for malignant neoplasm of colon [Z12.11])    Surgeons: Garth De La Torre MD Provider: Frank Sharp MD    Anesthesia Type: general ASA Status: 2          Anesthesia Type: general    Vitals  Vitals Value Taken Time   BP 98/64 08/26/22 1238   Temp 36.4 °C (97.5 °F) 08/26/22 1229   Pulse 68 08/26/22 1238   Resp 16 08/26/22 1238   SpO2 97 % 08/26/22 1238           Post Anesthesia Care and Evaluation    Patient location during evaluation: bedside  Patient participation: complete - patient participated  Level of consciousness: awake and alert  Pain management: adequate    Airway patency: patent  Anesthetic complications: No anesthetic complications  PONV Status: none  Cardiovascular status: acceptable  Respiratory status: acceptable  Hydration status: acceptable    Comments: BP 98/64 (BP Location: Left arm, Patient Position: Lying)   Pulse 68   Temp 36.4 °C (97.5 °F)   Resp 16   Ht 149.9 cm (59\")   Wt 54.9 kg (121 lb)   SpO2 97%   BMI 24.44 kg/m²         "

## 2022-09-26 ENCOUNTER — PATIENT MESSAGE (OUTPATIENT)
Dept: FAMILY MEDICINE CLINIC | Age: 67
End: 2022-09-26

## 2022-09-27 NOTE — TELEPHONE ENCOUNTER
From: Radha Mittal  To: Edwin Melissa MD  Sent: 9/26/2022 10:05 AM EDT  Subject: Covid    [place] Vásquez and I unfortunately got Covid. It will be two weeks for me Wednesday and 1 week for him Tuesday. My question is I have been taking Sudafed and Mucinex to help with the congestion but I still seem to be stuffy, not opening me up. Is there something else you might suggest?    Thank you,  Radha

## 2022-10-31 ENCOUNTER — OFFICE VISIT (OUTPATIENT)
Dept: FAMILY MEDICINE CLINIC | Age: 67
End: 2022-10-31

## 2022-10-31 ENCOUNTER — LAB (OUTPATIENT)
Dept: LAB | Facility: HOSPITAL | Age: 67
End: 2022-10-31

## 2022-10-31 VITALS
SYSTOLIC BLOOD PRESSURE: 129 MMHG | WEIGHT: 124 LBS | TEMPERATURE: 97.9 F | DIASTOLIC BLOOD PRESSURE: 80 MMHG | HEART RATE: 71 BPM | HEIGHT: 59 IN | BODY MASS INDEX: 25 KG/M2

## 2022-10-31 DIAGNOSIS — E03.9 ACQUIRED HYPOTHYROIDISM: ICD-10-CM

## 2022-10-31 DIAGNOSIS — F41.8 OTHER SPECIFIED ANXIETY DISORDERS: ICD-10-CM

## 2022-10-31 DIAGNOSIS — I10 ESSENTIAL HYPERTENSION: ICD-10-CM

## 2022-10-31 DIAGNOSIS — E03.9 ACQUIRED HYPOTHYROIDISM: Primary | ICD-10-CM

## 2022-10-31 LAB
T4 FREE SERPL-MCNC: 1.36 NG/DL (ref 0.93–1.7)
TSH SERPL DL<=0.05 MIU/L-ACNC: 0.22 UIU/ML (ref 0.27–4.2)

## 2022-10-31 PROCEDURE — 84443 ASSAY THYROID STIM HORMONE: CPT

## 2022-10-31 PROCEDURE — 84439 ASSAY OF FREE THYROXINE: CPT

## 2022-10-31 PROCEDURE — 36415 COLL VENOUS BLD VENIPUNCTURE: CPT

## 2022-10-31 PROCEDURE — 99214 OFFICE O/P EST MOD 30 MIN: CPT | Performed by: FAMILY MEDICINE

## 2022-10-31 RX ORDER — LEVOTHYROXINE SODIUM 0.07 MG/1
75 TABLET ORAL
Qty: 90 TABLET | Refills: 3 | Status: SHIPPED | OUTPATIENT
Start: 2022-10-31 | End: 2023-02-22

## 2022-10-31 RX ORDER — METOPROLOL SUCCINATE 25 MG/1
25 TABLET, EXTENDED RELEASE ORAL NIGHTLY
Qty: 90 TABLET | Refills: 3 | Status: SHIPPED | OUTPATIENT
Start: 2022-10-31 | End: 2023-03-13

## 2022-10-31 NOTE — PROGRESS NOTES
Radha Mittal presents to [facility].    Chief Complaint:  Thyroid, blood pressure, anxiety    Subjective       History of Present Illness:  Rahda is in today for follow up on her care.  Radha has hypothyroidism for which she remains on levothyroxine as noted below.  We did decrease her dose about 3 months ago.  She is due for recheck on that.  She is unaware of any symptoms or side effects related to the change in medication.    She also has hypertension and remains on metoprolol for this.  Her blood pressure is fairly well controlled today.    She also has some degree of anxiety for which she has been on sertraline.  She has decreased the dose of this since I saw her most recently.  She is currently taking 50 mg daily and says that she feels okay on this.  She may consider dropping off of it over the next few weeks.    Review of Systems:  Review of Systems   Constitutional: Negative for chills and fever.   Respiratory: Negative for cough and shortness of breath.    Cardiovascular: Negative for chest pain and palpitations.   Gastrointestinal: Negative for abdominal pain, nausea and vomiting.        Objective   Medical History:  Past Medical History:   • Allergic   • Depression   • Disease of thyroid gland   • Diverticulosis   • Hemorrhoids   • Hypertension   • Polyp of hepatic flexure of colon     Past Surgical History:   • CARDIAC ABLATION   •  SECTION    x2   • COLONOSCOPY    Procedure: COLONOSCOPY;  Surgeon: Garth De La Torre MD;  Location: [facility] OR;  Service: Gastroenterology;  Laterality: N/A;  HEMORRHOIDS, DIVERTICULOSIS   • COLONOSCOPY W/ BIOPSIES AND POLYPECTOMY      Family History   Problem Relation Age of Onset   • Anxiety disorder Mother    • Hyperlipidemia Mother    • Thyroid disease Mother    • Depression Father    • Hyperlipidemia Father    • Hyperlipidemia Sister    • Thyroid disease Sister      Social History     Tobacco Use   • Smoking status: Never   •  "Smokeless tobacco: Never   Substance Use Topics   • Alcohol use: Not Currently       Health Maintenance Due   Topic Date Due   • TDAP/TD VACCINES (1 - Tdap) Never done        Immunization History   Administered Date(s) Administered   • COVID-19 (PFIZER) PURPLE CAP 09/10/2021, 10/01/2021   • Flu Vaccine Split Quad 10/19/2020   • Fluzone High-Dose 65+yrs 10/19/2021   • Shingrix 07/22/2020, 10/19/2020   • Zostavax 12/20/2013       Allergies   Allergen Reactions   • Latex Rash   • Sulfa Antibiotics Unknown - Low Severity        Medications:  Current Outpatient Medications on File Prior to Visit   Medication Sig   • aspirin 81 MG EC tablet Aspir-81 81 mg oral tablet,delayed release (DR/EC) take 1 tablet (81 mg) by oral route once daily   Active   • calcium carbonate (OS-DIANA) 1250 (500 Ca) MG tablet Calcium 500 500 mg calcium (1,250 mg) oral tablet take 1 tablet by oral route daily   Active   • Cholecalciferol 25 MCG (1000 UT) capsule Vitamin D3 1,000 unit oral capsule take 1 capsule by oral route daily   Active   • ferrous sulfate 325 (65 FE) MG tablet Take 1 tablet by mouth Daily.   • Magnesium 250 MG tablet Take  by mouth Every Other Day.   • sertraline (ZOLOFT) 100 MG tablet Take 1 tablet by mouth Daily.   • vitamin B-12 (CYANOCOBALAMIN) 1000 MCG tablet Take 1,000 mcg by mouth Every Other Day.   • [DISCONTINUED] levothyroxine (SYNTHROID, LEVOTHROID) 75 MCG tablet Take 1 tablet by mouth Every Morning.   • [DISCONTINUED] metoprolol succinate XL (TOPROL-XL) 25 MG 24 hr tablet Take 1 tablet by mouth Every Night.   • [DISCONTINUED] propranolol (INDERAL) 10 MG tablet Take 10 mg by mouth Daily.     No current facility-administered medications on file prior to visit.       Vital Signs:   /80 (BP Location: Right arm, Patient Position: Sitting)   Pulse 71   Temp 97.9 °F (36.6 °C) (Oral)   Ht 149.9 cm (59.02\")   Wt 56.2 kg (124 lb)   BMI 25.03 kg/m²       Physical Exam:  Physical Exam  Vitals reviewed. "   Constitutional:       General: She is not in acute distress.     Appearance: She is not ill-appearing.   Eyes:      Pupils: Pupils are equal, round, and reactive to light.   Neck:      Comments: No thyromegaly  Cardiovascular:      Rate and Rhythm: Normal rate and regular rhythm.   Pulmonary:      Effort: Pulmonary effort is normal.      Breath sounds: Normal breath sounds.   Abdominal:      General: There is no distension.      Palpations: Abdomen is soft.      Tenderness: There is no abdominal tenderness.   Musculoskeletal:      Cervical back: Neck supple.   Lymphadenopathy:      Cervical: No cervical adenopathy.   Skin:     Findings: No lesion or rash.   Neurological:      Mental Status: She is alert.         Result Review      The following data was reviewed by Edwin Melissa MD on 10/31/2022.  Lab Results   Component Value Date    WBC 6.28 07/26/2022    HGB 13.1 07/26/2022    HCT 43.2 07/26/2022    MCV 81.5 07/26/2022     07/26/2022     Lab Results   Component Value Date    GLUCOSE 84 07/26/2022    BUN 9 07/26/2022    CREATININE 0.61 07/26/2022     07/26/2022    K 3.9 07/26/2022     07/26/2022    CO2 27.0 07/26/2022    CALCIUM 8.9 07/26/2022    PROTEINTOT 6.5 07/26/2022    ALBUMIN 4.30 07/26/2022    ALT 15 07/26/2022    AST 15 07/26/2022    ALKPHOS 50 07/26/2022    BILITOT 0.3 07/26/2022    EGFRIFNONA 97 12/30/2021    GLOB 2.2 07/26/2022    AGRATIO 2.0 07/26/2022    BCR 14.8 07/26/2022    ANIONGAP 11.0 07/26/2022      Lab Results   Component Value Date    CHOL 197 07/26/2022    CHLPL 218 (H) 04/26/2021    TRIG 115 07/26/2022    HDL 52 07/26/2022     (H) 07/26/2022     Lab Results   Component Value Date    TSH 0.022 (L) 07/26/2022     No results found for: HGBA1C         Assessment and Plan:   Radha is doing well at this time.  The thyroid test was off on her most recent check, and we did decrease the dose of levothyroxine.  For this reason, we will recheck thyroid blood work  today.  Otherwise, her problem seems stable.  We will refill needed medications for a year, and we will put off seeing her for about a year unless something arises.  No other short-term changes anticipated       Diagnoses and all orders for this visit:    1. Acquired hypothyroidism (Primary)  -     TSH+Free T4; Future  -     levothyroxine (SYNTHROID, LEVOTHROID) 75 MCG tablet; Take 1 tablet by mouth Every Morning.  Dispense: 90 tablet; Refill: 3    2. Essential hypertension  -     metoprolol succinate XL (TOPROL-XL) 25 MG 24 hr tablet; Take 1 tablet by mouth Every Night.  Dispense: 90 tablet; Refill: 3    3. Other specified anxiety disorders  Comments:  As above.          Follow Up   Return in about 1 year (around 10/31/2023) for Recheck, Medicare Wellness.  Patient was given instructions and counseling regarding her condition or for health maintenance advice. Please see specific information pulled into the AVS if appropriate.

## 2023-01-30 ENCOUNTER — PATIENT MESSAGE (OUTPATIENT)
Dept: FAMILY MEDICINE CLINIC | Age: 68
End: 2023-01-30
Payer: MEDICARE

## 2023-01-30 DIAGNOSIS — E78.00 HYPERCHOLESTEROLEMIA: ICD-10-CM

## 2023-01-30 DIAGNOSIS — E03.9 ACQUIRED HYPOTHYROIDISM: Primary | ICD-10-CM

## 2023-01-30 DIAGNOSIS — R73.9 HYPERGLYCEMIA: ICD-10-CM

## 2023-01-30 DIAGNOSIS — M81.0 POSTMENOPAUSAL OSTEOPOROSIS: Primary | ICD-10-CM

## 2023-01-30 DIAGNOSIS — D51.9 ANEMIA DUE TO VITAMIN B12 DEFICIENCY, UNSPECIFIED B12 DEFICIENCY TYPE: ICD-10-CM

## 2023-01-30 NOTE — PROGRESS NOTES
Good morning, Radha.  I had a reminder to review your chart and have placed orders for blood work.  We will check your thyroid, but since we are in a new year, I have placed orders for fairly complete labs.  The lab is roughly open between 7:00 AM and 5:30 PM Monday through Friday.  No appointment is needed, and you should be fasting for these labs.  Let me know if you have other concerns.    Edwin Melissa MD  [facility]    PS - [employer] messages are not reviewed on an urgent basis.  It may be several days before we review and/or respond to your message.  If you have an urgent need, please call the office at [phone].

## 2023-02-15 ENCOUNTER — TELEPHONE (OUTPATIENT)
Dept: FAMILY MEDICINE CLINIC | Age: 68
End: 2023-02-15
Payer: MEDICARE

## 2023-02-16 RX ORDER — ALENDRONATE SODIUM 70 MG/1
70 TABLET ORAL
Qty: 13 TABLET | Refills: 3 | Status: SHIPPED | OUTPATIENT
Start: 2023-02-16

## 2023-02-16 NOTE — TELEPHONE ENCOUNTER
From: Edwin Melissa  To: Radha Mittal  Sent: 1/30/2023 7:01 AM EST  Subject: Lab orders    Good morning, Radha. I had a reminder to review your chart and have placed orders for blood work. We will check your thyroid, but since we are in a new year, I have placed orders for fairly complete labs. The lab is roughly open between 7:00 AM and 5:30 PM Monday through Friday. No appointment is needed, and you should be fasting for these labs. Let me know if you have other concerns.    Edwin Melissa MD  [facility]    PS - [employer] messages are not reviewed on an urgent basis. It may be several days before we review and/or respond to your message. If you have an urgent need, please call the office at [phone].

## 2023-02-21 ENCOUNTER — LAB (OUTPATIENT)
Dept: LAB | Facility: HOSPITAL | Age: 68
End: 2023-02-21
Payer: MEDICARE

## 2023-02-21 DIAGNOSIS — E03.9 ACQUIRED HYPOTHYROIDISM: ICD-10-CM

## 2023-02-21 DIAGNOSIS — E78.00 HYPERCHOLESTEROLEMIA: ICD-10-CM

## 2023-02-21 DIAGNOSIS — D51.9 ANEMIA DUE TO VITAMIN B12 DEFICIENCY, UNSPECIFIED B12 DEFICIENCY TYPE: ICD-10-CM

## 2023-02-21 DIAGNOSIS — R73.9 HYPERGLYCEMIA: ICD-10-CM

## 2023-02-21 LAB
ALBUMIN SERPL-MCNC: 4.4 G/DL (ref 3.5–5.2)
ALBUMIN/GLOB SERPL: 1.9 G/DL
ALP SERPL-CCNC: 50 U/L (ref 39–117)
ALT SERPL W P-5'-P-CCNC: 17 U/L (ref 1–33)
ANION GAP SERPL CALCULATED.3IONS-SCNC: 9.3 MMOL/L (ref 5–15)
AST SERPL-CCNC: 16 U/L (ref 1–32)
BILIRUB SERPL-MCNC: 0.3 MG/DL (ref 0–1.2)
BUN SERPL-MCNC: 14 MG/DL (ref 8–23)
BUN/CREAT SERPL: 21.5 (ref 7–25)
CALCIUM SPEC-SCNC: 8.9 MG/DL (ref 8.6–10.5)
CHLORIDE SERPL-SCNC: 105 MMOL/L (ref 98–107)
CHOLEST SERPL-MCNC: 212 MG/DL (ref 0–200)
CO2 SERPL-SCNC: 27.7 MMOL/L (ref 22–29)
CREAT SERPL-MCNC: 0.65 MG/DL (ref 0.57–1)
DEPRECATED RDW RBC AUTO: 42.9 FL (ref 37–54)
EGFRCR SERPLBLD CKD-EPI 2021: 97.2 ML/MIN/1.73
ERYTHROCYTE [DISTWIDTH] IN BLOOD BY AUTOMATED COUNT: 14.6 % (ref 12.3–15.4)
FOLATE SERPL-MCNC: >20 NG/ML (ref 4.78–24.2)
GLOBULIN UR ELPH-MCNC: 2.3 GM/DL
GLUCOSE SERPL-MCNC: 95 MG/DL (ref 65–99)
HBA1C MFR BLD: 5.6 % (ref 4.8–5.6)
HCT VFR BLD AUTO: 42.4 % (ref 34–46.6)
HDLC SERPL-MCNC: 57 MG/DL (ref 40–60)
HGB BLD-MCNC: 13 G/DL (ref 12–15.9)
LDLC SERPL CALC-MCNC: 140 MG/DL (ref 0–100)
LDLC/HDLC SERPL: 2.43 {RATIO}
MCH RBC QN AUTO: 24.8 PG (ref 26.6–33)
MCHC RBC AUTO-ENTMCNC: 30.7 G/DL (ref 31.5–35.7)
MCV RBC AUTO: 80.8 FL (ref 79–97)
PLATELET # BLD AUTO: 324 10*3/MM3 (ref 140–450)
PMV BLD AUTO: 8.9 FL (ref 6–12)
POTASSIUM SERPL-SCNC: 4 MMOL/L (ref 3.5–5.2)
PROT SERPL-MCNC: 6.7 G/DL (ref 6–8.5)
RBC # BLD AUTO: 5.25 10*6/MM3 (ref 3.77–5.28)
SODIUM SERPL-SCNC: 142 MMOL/L (ref 136–145)
T4 FREE SERPL-MCNC: 1.63 NG/DL (ref 0.93–1.7)
TRIGL SERPL-MCNC: 82 MG/DL (ref 0–150)
TSH SERPL DL<=0.05 MIU/L-ACNC: 0.13 UIU/ML (ref 0.27–4.2)
VIT B12 BLD-MCNC: 808 PG/ML (ref 211–946)
VLDLC SERPL-MCNC: 15 MG/DL (ref 5–40)
WBC NRBC COR # BLD: 6.48 10*3/MM3 (ref 3.4–10.8)

## 2023-02-21 PROCEDURE — 84439 ASSAY OF FREE THYROXINE: CPT

## 2023-02-21 PROCEDURE — 80053 COMPREHEN METABOLIC PANEL: CPT

## 2023-02-21 PROCEDURE — 80061 LIPID PANEL: CPT

## 2023-02-21 PROCEDURE — 84443 ASSAY THYROID STIM HORMONE: CPT

## 2023-02-21 PROCEDURE — 36415 COLL VENOUS BLD VENIPUNCTURE: CPT

## 2023-02-21 PROCEDURE — 83036 HEMOGLOBIN GLYCOSYLATED A1C: CPT

## 2023-02-21 PROCEDURE — 82607 VITAMIN B-12: CPT

## 2023-02-21 PROCEDURE — 82746 ASSAY OF FOLIC ACID SERUM: CPT

## 2023-02-21 PROCEDURE — 85027 COMPLETE CBC AUTOMATED: CPT

## 2023-02-22 DIAGNOSIS — E03.9 ACQUIRED HYPOTHYROIDISM: ICD-10-CM

## 2023-02-22 RX ORDER — LEVOTHYROXINE SODIUM 0.12 MG/1
62.5 TABLET ORAL
Qty: 90 TABLET | Refills: 3 | Status: SHIPPED | OUTPATIENT
Start: 2023-02-22

## 2023-03-11 DIAGNOSIS — I10 ESSENTIAL HYPERTENSION: ICD-10-CM

## 2023-03-13 ENCOUNTER — TELEPHONE (OUTPATIENT)
Dept: FAMILY MEDICINE CLINIC | Age: 68
End: 2023-03-13
Payer: MEDICARE

## 2023-03-13 DIAGNOSIS — I10 ESSENTIAL HYPERTENSION: ICD-10-CM

## 2023-03-13 RX ORDER — METOPROLOL SUCCINATE 25 MG/1
TABLET, EXTENDED RELEASE ORAL
Qty: 90 TABLET | Refills: 0 | Status: SHIPPED | OUTPATIENT
Start: 2023-03-13 | End: 2023-03-13 | Stop reason: SDUPTHER

## 2023-03-13 RX ORDER — METOPROLOL SUCCINATE 25 MG/1
25 TABLET, EXTENDED RELEASE ORAL NIGHTLY
Qty: 90 TABLET | Refills: 0 | Status: SHIPPED | OUTPATIENT
Start: 2023-03-13

## 2023-03-13 NOTE — TELEPHONE ENCOUNTER
----- Message from Estrella Murillo LPN sent at 3/13/2023  8:07 AM EDT -----  Regarding: FW: Refill  Contact: [phone]    ----- Message -----  From: Radha Mittal  Sent: 3/12/2023   4:39 PM EDT  To: [facility]  Subject: Refill                                           Dr. Melissa I am going to need a refill on Metoprolol ER 25 MG, 90 days for my blood pressure sent to [facility].  Thank you, Radha

## 2023-04-06 ENCOUNTER — TRANSCRIBE ORDERS (OUTPATIENT)
Dept: ADMINISTRATIVE | Facility: HOSPITAL | Age: 68
End: 2023-04-06
Payer: MEDICARE

## 2023-04-06 DIAGNOSIS — Z12.31 VISIT FOR SCREENING MAMMOGRAM: Primary | ICD-10-CM

## 2023-05-17 ENCOUNTER — HOSPITAL ENCOUNTER (OUTPATIENT)
Dept: MAMMOGRAPHY | Facility: HOSPITAL | Age: 68
Discharge: HOME | End: 2023-05-17
Admitting: FAMILY MEDICINE
Payer: MEDICARE

## 2023-05-17 ENCOUNTER — TRANSCRIBE ORDERS (OUTPATIENT)
Dept: ADMINISTRATIVE | Facility: HOSPITAL | Age: 68
End: 2023-05-17
Payer: MEDICARE

## 2023-05-17 DIAGNOSIS — Z12.31 SCREENING MAMMOGRAM, ENCOUNTER FOR: Primary | ICD-10-CM

## 2023-05-17 DIAGNOSIS — Z12.31 VISIT FOR SCREENING MAMMOGRAM: ICD-10-CM

## 2023-05-17 PROCEDURE — 77067 SCR MAMMO BI INCL CAD: CPT

## 2023-05-17 PROCEDURE — 77063 BREAST TOMOSYNTHESIS BI: CPT

## 2023-05-22 ENCOUNTER — TELEPHONE (OUTPATIENT)
Dept: FAMILY MEDICINE CLINIC | Age: 68
End: 2023-05-22
Payer: MEDICARE

## 2023-05-22 DIAGNOSIS — E03.9 ACQUIRED HYPOTHYROIDISM: Primary | ICD-10-CM

## 2023-05-22 NOTE — TELEPHONE ENCOUNTER
----- Message from Estrella Murillo LPN sent at 2/22/2023  9:58 AM EST -----  TICKLE for TSH in 90 days.

## 2023-05-23 ENCOUNTER — LAB (OUTPATIENT)
Dept: LAB | Facility: HOSPITAL | Age: 68
End: 2023-05-23
Payer: MEDICARE

## 2023-05-23 DIAGNOSIS — E03.9 ACQUIRED HYPOTHYROIDISM: ICD-10-CM

## 2023-05-23 LAB — TSH SERPL DL<=0.05 MIU/L-ACNC: 0.8 UIU/ML (ref 0.27–4.2)

## 2023-05-23 PROCEDURE — 84443 ASSAY THYROID STIM HORMONE: CPT

## 2023-05-23 PROCEDURE — 36415 COLL VENOUS BLD VENIPUNCTURE: CPT

## 2023-09-11 DIAGNOSIS — I10 ESSENTIAL HYPERTENSION: ICD-10-CM

## 2023-09-11 RX ORDER — METOPROLOL SUCCINATE 25 MG/1
25 TABLET, EXTENDED RELEASE ORAL NIGHTLY
Qty: 90 TABLET | Refills: 0 | Status: SHIPPED | OUTPATIENT
Start: 2023-09-11

## 2023-11-02 ENCOUNTER — OFFICE VISIT (OUTPATIENT)
Dept: FAMILY MEDICINE CLINIC | Age: 68
End: 2023-11-02
Payer: MEDICARE

## 2023-11-02 ENCOUNTER — LAB (OUTPATIENT)
Dept: LAB | Facility: HOSPITAL | Age: 68
End: 2023-11-02
Payer: MEDICARE

## 2023-11-02 VITALS
HEART RATE: 73 BPM | DIASTOLIC BLOOD PRESSURE: 86 MMHG | WEIGHT: 126.4 LBS | BODY MASS INDEX: 25.48 KG/M2 | SYSTOLIC BLOOD PRESSURE: 146 MMHG | HEIGHT: 59 IN | TEMPERATURE: 97 F

## 2023-11-02 DIAGNOSIS — Z78.0 ASYMPTOMATIC POSTMENOPAUSAL STATE: ICD-10-CM

## 2023-11-02 DIAGNOSIS — I10 ESSENTIAL HYPERTENSION: ICD-10-CM

## 2023-11-02 DIAGNOSIS — E78.00 HYPERCHOLESTEROLEMIA: ICD-10-CM

## 2023-11-02 DIAGNOSIS — E03.9 ACQUIRED HYPOTHYROIDISM: ICD-10-CM

## 2023-11-02 DIAGNOSIS — Z00.00 PHYSICAL EXAM: Primary | ICD-10-CM

## 2023-11-02 DIAGNOSIS — Z12.31 BREAST CANCER SCREENING BY MAMMOGRAM: ICD-10-CM

## 2023-11-02 DIAGNOSIS — M81.0 POSTMENOPAUSAL OSTEOPOROSIS: ICD-10-CM

## 2023-11-02 DIAGNOSIS — R53.83 FATIGUE, UNSPECIFIED TYPE: ICD-10-CM

## 2023-11-02 LAB
ALBUMIN SERPL-MCNC: 4.7 G/DL (ref 3.5–5.2)
ALBUMIN/GLOB SERPL: 2.1 G/DL
ALP SERPL-CCNC: 34 U/L (ref 39–117)
ALT SERPL W P-5'-P-CCNC: 16 U/L (ref 1–33)
ANION GAP SERPL CALCULATED.3IONS-SCNC: 8 MMOL/L (ref 5–15)
AST SERPL-CCNC: 16 U/L (ref 1–32)
BILIRUB SERPL-MCNC: 0.3 MG/DL (ref 0–1.2)
BUN SERPL-MCNC: 11 MG/DL (ref 8–23)
BUN/CREAT SERPL: 15.3 (ref 7–25)
CALCIUM SPEC-SCNC: 9.2 MG/DL (ref 8.6–10.5)
CHLORIDE SERPL-SCNC: 107 MMOL/L (ref 98–107)
CO2 SERPL-SCNC: 28 MMOL/L (ref 22–29)
CREAT SERPL-MCNC: 0.72 MG/DL (ref 0.57–1)
DEPRECATED RDW RBC AUTO: 44.5 FL (ref 37–54)
EGFRCR SERPLBLD CKD-EPI 2021: 91.8 ML/MIN/1.73
ERYTHROCYTE [DISTWIDTH] IN BLOOD BY AUTOMATED COUNT: 14.8 % (ref 12.3–15.4)
FOLATE SERPL-MCNC: >20 NG/ML (ref 4.78–24.2)
GLOBULIN UR ELPH-MCNC: 2.2 GM/DL
GLUCOSE SERPL-MCNC: 98 MG/DL (ref 65–99)
HCT VFR BLD AUTO: 44.7 % (ref 34–46.6)
HGB BLD-MCNC: 13.8 G/DL (ref 12–15.9)
MCH RBC QN AUTO: 25 PG (ref 26.6–33)
MCHC RBC AUTO-ENTMCNC: 30.9 G/DL (ref 31.5–35.7)
MCV RBC AUTO: 81.1 FL (ref 79–97)
PLATELET # BLD AUTO: 357 10*3/MM3 (ref 140–450)
PMV BLD AUTO: 9.5 FL (ref 6–12)
POTASSIUM SERPL-SCNC: 4.3 MMOL/L (ref 3.5–5.2)
PROT SERPL-MCNC: 6.9 G/DL (ref 6–8.5)
RBC # BLD AUTO: 5.51 10*6/MM3 (ref 3.77–5.28)
SODIUM SERPL-SCNC: 143 MMOL/L (ref 136–145)
T4 FREE SERPL-MCNC: 1.35 NG/DL (ref 0.93–1.7)
TSH SERPL DL<=0.05 MIU/L-ACNC: 3.35 UIU/ML (ref 0.27–4.2)
VIT B12 BLD-MCNC: 1223 PG/ML (ref 211–946)
WBC NRBC COR # BLD: 5.34 10*3/MM3 (ref 3.4–10.8)

## 2023-11-02 PROCEDURE — 85027 COMPLETE CBC AUTOMATED: CPT

## 2023-11-02 PROCEDURE — 36415 COLL VENOUS BLD VENIPUNCTURE: CPT

## 2023-11-02 PROCEDURE — 82607 VITAMIN B-12: CPT

## 2023-11-02 PROCEDURE — 82746 ASSAY OF FOLIC ACID SERUM: CPT

## 2023-11-02 PROCEDURE — 80053 COMPREHEN METABOLIC PANEL: CPT

## 2023-11-02 PROCEDURE — 84443 ASSAY THYROID STIM HORMONE: CPT

## 2023-11-02 PROCEDURE — 84439 ASSAY OF FREE THYROXINE: CPT

## 2023-11-02 RX ORDER — ALENDRONATE SODIUM 70 MG/1
70 TABLET ORAL
Qty: 13 TABLET | Refills: 3 | Status: SHIPPED | OUTPATIENT
Start: 2023-11-02

## 2023-11-02 RX ORDER — METOPROLOL SUCCINATE 25 MG/1
25 TABLET, EXTENDED RELEASE ORAL NIGHTLY
Qty: 90 TABLET | Refills: 3 | Status: SHIPPED | OUTPATIENT
Start: 2023-11-02

## 2023-11-02 NOTE — PROGRESS NOTES
The ABCs of the Annual Wellness Visit  Subsequent Medicare Wellness Visit    Subjective    Radha Mittal is a 68 y.o. female who presents for a Subsequent Medicare Wellness Visit.    The following portions of the patient's history were reviewed and updated as appropriate: allergies, current medications, past family history, past medical history, past social history, past surgical history, and problem list.    Compared to one year ago, the patient feels her physical health is the same.    Compared to one year ago, the patient feels her mental health is worse.  She has had some increased stress over the last year.    Recent Hospitalizations:  She was not admitted to the hospital during the last year.     Current Medical Providers:  Patient Care Team:  Edwin Melissa MD as PCP - General (Family Medicine)    Outpatient Medications Prior to Visit   Medication Sig Dispense Refill    aspirin 81 MG EC tablet Aspir-81 81 mg oral tablet,delayed release (DR/EC) take 1 tablet (81 mg) by oral route once daily   Active      calcium carbonate (OS-DIAAN) 1250 (500 Ca) MG tablet Calcium 500 500 mg calcium (1,250 mg) oral tablet take 1 tablet by oral route daily   Active      Cholecalciferol 25 MCG (1000 UT) capsule Vitamin D3 1,000 unit oral capsule take 1 capsule by oral route daily   Active      ferrous sulfate 325 (65 FE) MG tablet Take 1 tablet by mouth Daily. 90 tablet 1    levothyroxine (SYNTHROID, LEVOTHROID) 125 MCG tablet Take 0.5 tablets by mouth Every Morning. 90 tablet 3    Magnesium 250 MG tablet Take  by mouth Every Other Day.      vitamin B-12 (CYANOCOBALAMIN) 1000 MCG tablet Take 1 tablet by mouth Every Other Day.      alendronate (FOSAMAX) 70 MG tablet Take 1 tablet by mouth Every 7 (Seven) Days. 13 tablet 3    metoprolol succinate XL (TOPROL-XL) 25 MG 24 hr tablet Take 1 tablet by mouth Every Night. 90 tablet 0    sertraline (ZOLOFT) 100 MG tablet Take 1 tablet by mouth Daily. 90 tablet 1     No  "facility-administered medications prior to visit.       No opioid medication identified on active medication list. I have reviewed chart for other potential  high risk medication/s and harmful drug interactions in the elderly.      Aspirin is on active medication list. Aspirin use is indicated based on review of current medical condition/s. Pros and cons of this therapy have been discussed today. Benefits of this medication outweigh potential harm.  Patient has been encouraged to continue taking this medication.      Patient Active Problem List   Diagnosis    Hypercholesterolemia    Hypothyroidism    Rash    Vitamin B12 deficiency anemia    Constipation    Encounter for screening for malignant neoplasm of colon    Essential hypertension    Other specified anxiety disorders     Advance Care Planning  Advance Directive is on file.  ACP discussion was held with the patient during this visit. Patient has an advance directive in EMR which is still valid.   Her , [place], would make decisions if needed.     Objective    Vitals:    11/02/23 1103   BP: 145/87   BP Location: Left arm   Patient Position: Sitting   Pulse: 81   Temp: 97 °F (36.1 °C)   TempSrc: Oral   Weight: 57.3 kg (126 lb 6.4 oz)   Height: 149.9 cm (59.02\")   PainSc: 0-No pain     Estimated body mass index is 25.52 kg/m² as calculated from the following:    Height as of this encounter: 149.9 cm (59.02\").    Weight as of this encounter: 57.3 kg (126 lb 6.4 oz).    BMI is >= 25 and <30. (Overweight) The following options were offered after discussion;: exercise counseling/recommendations and nutrition counseling/recommendations    Does the patient have evidence of cognitive impairment? No.        HEALTH RISK ASSESSMENT    Smoking Status:  Social History     Tobacco Use   Smoking Status Never   Smokeless Tobacco Never     Alcohol Consumption:  Social History     Substance and Sexual Activity   Alcohol Use Not Currently     Fall Risk Screen:    STEADI Fall " Risk Assessment was completed, and patient is at LOW risk for falls.Assessment completed on:2023    Depression Screenin/2/2023    11:01 AM   PHQ-2/PHQ-9 Depression Screening   Little Interest or Pleasure in Doing Things 0-->not at all   Feeling Down, Depressed or Hopeless 0-->not at all   PHQ-9: Brief Depression Severity Measure Score 0       Health Habits and Functional and Cognitive Screenin/2/2023    11:02 AM   Functional & Cognitive Status   Do you have difficulty preparing food and eating? No   Do you have difficulty bathing yourself, getting dressed or grooming yourself? No   Do you have difficulty using the toilet? No   Do you have difficulty moving around from place to place? No   Do you have trouble with steps or getting out of a bed or a chair? No   Current Diet Well Balanced Diet        Current Diet Comment vegetarian   Dental Exam Up to date   Eye Exam Up to date   Exercise (times per week) 0 times per week   Current Exercises Include No Regular Exercise   Do you need help using the phone?  No   Are you deaf or do you have serious difficulty hearing?  No   Do you need help to go to places out of walking distance? No   Do you need help shopping? No   Do you need help preparing meals?  No   Do you need help with housework?  No   Do you need help with laundry? No   Do you need help taking your medications? No   Do you need help managing money? No   Do you ever drive or ride in a car without wearing a seat belt? No   Have you felt unusual stress, anger or loneliness in the last month? No   Who do you live with? Spouse   If you need help, do you have trouble finding someone available to you? No   Have you been bothered in the last four weeks by sexual problems? No   Do you have difficulty concentrating, remembering or making decisions? No       Age-appropriate Screening Schedule:  Refer to the list below for future screening recommendations based on patient's age, sex and/or medical  conditions. Orders for these recommended tests are listed in the plan section. The patient has been provided with a written plan.    Health Maintenance   Topic Date Due    BMI FOLLOWUP  Never done    TDAP/TD VACCINES (1 - Tdap) Never done    INFLUENZA VACCINE  03/31/2024 (Originally 8/1/2023)    COVID-19 Vaccine (3 - 2023-24 season) 11/01/2024 (Originally 9/1/2023)    Pneumococcal Vaccine 65+ (2 - PCV) 11/02/2024 (Originally 4/26/2022)    LIPID PANEL  02/21/2024    DXA SCAN  05/09/2024    ANNUAL WELLNESS VISIT  11/02/2024    MAMMOGRAM  05/17/2025    COLORECTAL CANCER SCREENING  08/26/2027    HEPATITIS C SCREENING  Completed    ZOSTER VACCINE  Completed          CMS Preventative Services Quick Reference  Risk Factors Identified During Encounter  Immunizations Discussed/Encouraged: Tdap, Influenza, Prevnar 20 (Pneumococcal 20-valent conjugate), and COVID19  The above risks/problems have been discussed with the patient.  Pertinent information has been shared with the patient in the After Visit Summary.  An After Visit Summary and PPPS were made available to the patient.    Follow Up:   Next Medicare Wellness visit to be scheduled in 1 year.     Additional E&M Note during same encounter follows:  Patient has multiple medical problems which are significant and separately identifiable that require additional work above and beyond the Medicare Wellness Visit.      Chief Complaint:  Blood pressure, thyroid, anemia    Subjective    History of Present Illness:  In addition to the Medicare wellness exam, Carrie is also here for follow-up on her usual care.  She has hypertension for which she remains on metoprolol succinate as noted.  Her blood pressure is just above goal on initial check.  However, she brings a log of her readings from home.  Her pressures are always within goal ranges there.  She is not aware of obvious side effects from this or her other medications.    She also remains on thyroid medication.  She is due  "for recheck on her TSH today.    Review of Systems:  Review of Systems   Constitutional:  Negative for chills and fever.   Respiratory:  Negative for cough and shortness of breath.    Cardiovascular:  Negative for chest pain and palpitations.   Gastrointestinal:  Negative for abdominal pain, nausea and vomiting.      Objective   Vital Signs:  Vitals:    11/02/23 1103 11/02/23 1135   BP: 145/87 146/86   BP Location: Left arm Left arm   Patient Position: Sitting Sitting   Pulse: 81 73   Temp: 97 °F (36.1 °C)    TempSrc: Oral    Weight: 57.3 kg (126 lb 6.4 oz)    Height: 149.9 cm (59.02\")    PainSc: 0-No pain    Body mass index is 25.52 kg/m².    Physical Exam  Vitals and nursing note reviewed.   Constitutional:       General: She is not in acute distress.     Appearance: She is not ill-appearing.   HENT:      Right Ear: Tympanic membrane and ear canal normal.      Left Ear: Tympanic membrane and ear canal normal.      Ears:      Comments: Hearing is normal with forced whisper bilaterally.     Mouth/Throat:      Mouth: Mucous membranes are moist.      Comments: Pharynx appears normal  Eyes:      Extraocular Movements: Extraocular movements intact.      Pupils: Pupils are equal, round, and reactive to light.      Comments: Binocular vision is 20/20 with correction.   Neck:      Thyroid: No thyromegaly.   Cardiovascular:      Rate and Rhythm: Normal rate and regular rhythm.      Heart sounds: No murmur heard.  Pulmonary:      Effort: Pulmonary effort is normal.      Breath sounds: Normal breath sounds.   Abdominal:      General: There is no distension.      Palpations: Abdomen is soft. There is no mass.      Tenderness: There is no abdominal tenderness.   Musculoskeletal:      Cervical back: Normal range of motion.   Skin:     Findings: No lesion or rash.   Neurological:      General: No focal deficit present.      Mental Status: She is oriented to person, place, and time.      Cranial Nerves: No cranial nerve deficit. "   Psychiatric:         Mood and Affect: Mood normal.   The following data was reviewed by Edwin Melissa MD on 11/02/2023.  Lab Results   Component Value Date    WBC 6.48 02/21/2023    HGB 13.0 02/21/2023    HCT 42.4 02/21/2023    MCV 80.8 02/21/2023     02/21/2023     Lab Results   Component Value Date    GLUCOSE 95 02/21/2023    BUN 14 02/21/2023    CREATININE 0.65 02/21/2023     02/21/2023    K 4.0 02/21/2023     02/21/2023    CO2 27.7 02/21/2023    CALCIUM 8.9 02/21/2023    PROTEINTOT 6.7 02/21/2023    ALBUMIN 4.4 02/21/2023    ALT 17 02/21/2023    AST 16 02/21/2023    ALKPHOS 50 02/21/2023    BILITOT 0.3 02/21/2023    EGFR 97.2 02/21/2023    GLOB 2.3 02/21/2023    AGRATIO 1.9 02/21/2023    BCR 21.5 02/21/2023    ANIONGAP 9.3 02/21/2023      Lab Results   Component Value Date    CHOL 212 (H) 02/21/2023    CHLPL 218 (H) 04/26/2021    TRIG 82 02/21/2023    HDL 57 02/21/2023     (H) 02/21/2023     Lab Results   Component Value Date    TSH 0.795 05/23/2023     Lab Results   Component Value Date    HGBA1C 5.60 02/21/2023             Assessment and Plan:   Today, we have reviewed her care.  Radha is doing well overall.  Regarding the Medicare wellness exam, she is basically up-to-date on recommended cancer screenings.  She would like to go ahead and be scheduled for the mammogram next year when due so that it is not delayed significantly.  Orders have been placed for this and her DEXA scan.  We also reviewed vaccines, and she is mostly up-to-date on these.    Regarding her usual care, her blood pressure is mildly elevated on recheck.  It has been in a good range at home though.  For now, we will refill her current medications and update labs as noted.  Tentatively, we will plan to see her again in about a year.  I anticipate rechecking the thyroid prior to that though.    Diagnoses and all orders for this visit:    1. Physical exam (Primary)    2. Essential hypertension  -      metoprolol succinate XL (TOPROL-XL) 25 MG 24 hr tablet; Take 1 tablet by mouth Every Night.  Dispense: 90 tablet; Refill: 3  -     Comprehensive Metabolic Panel; Future    3. Hypercholesterolemia  -     Comprehensive Metabolic Panel; Future    4. Acquired hypothyroidism  -     TSH+Free T4; Future    5. Breast cancer screening by mammogram  -     Mammo Screening Digital Tomosynthesis Bilateral With CAD; Future    6. Asymptomatic postmenopausal state  -     DEXA Bone Density Axial; Future    7. Postmenopausal osteoporosis  -     alendronate (FOSAMAX) 70 MG tablet; Take 1 tablet by mouth Every 7 (Seven) Days.  Dispense: 13 tablet; Refill: 3    8. Fatigue, unspecified type  -     CBC (No Diff); Future  -     Vitamin B12 & Folate; Future       Follow Up  Return in about 1 year (around 11/2/2024) for Recheck, Medicare Wellness.  Patient was given instructions and counseling regarding her condition or for health maintenance advice. Please see specific information pulled into the AVS if appropriate.

## 2023-11-03 ENCOUNTER — PATIENT MESSAGE (OUTPATIENT)
Dept: FAMILY MEDICINE CLINIC | Age: 68
End: 2023-11-03
Payer: MEDICARE

## 2023-11-03 RX ORDER — LEVOTHYROXINE SODIUM 0.12 MG/1
62.5 TABLET ORAL
Qty: 90 TABLET | Refills: 3 | Status: SHIPPED | OUTPATIENT
Start: 2023-11-03 | End: 2023-11-06 | Stop reason: SDUPTHER

## 2023-11-06 DIAGNOSIS — E03.9 ACQUIRED HYPOTHYROIDISM: ICD-10-CM

## 2023-11-06 RX ORDER — LEVOTHYROXINE SODIUM 0.12 MG/1
62.5 TABLET ORAL
Qty: 90 TABLET | Refills: 3 | Status: SHIPPED | OUTPATIENT
Start: 2023-11-06

## 2023-11-06 NOTE — TELEPHONE ENCOUNTER
Caller: Radha Mittal    Relationship: Self    Best call back number: [phone]    Requested Prescriptions:   Requested Prescriptions     Pending Prescriptions Disp Refills    levothyroxine (SYNTHROID, LEVOTHROID) 125 MCG tablet 90 tablet 3     Sig: Take 0.5 tablets by mouth Every Morning.        Pharmacy where request should be sent: [facility] [address] [place] [address] AUBREE EDWARDS [facility] [phone] [facility] [phone] FX     Last office visit with prescribing clinician: 11/2/2023   Last telemedicine visit with prescribing clinician: Visit date not found   Next office visit with prescribing clinician: 11/4/2024     Additional details provided by patient: THIS MEDICATION WAS SENT TO [facility]. HOWEVER, THE PRICE HAS GONE UP AND SHE IS ABLE TO GET THIS MEDICATION FROM [facility] IN [place] FOR $11.00 LESS THAN WHAT SHE WOULD PAY WITH [facility], SO SHE IS REQUESTING THIS MEDICATION TO BE SENT TO [facility]. PATIENT CURRENTLY HAS ENOUGH UNTIL NEXT WEEK.     Does the patient have less than a 3 day supply:  [] Yes  [x] No    Would you like a call back once the refill request has been completed: [] Yes [x] No    If the office needs to give you a call back, can they leave a voicemail: [] Yes [x] No    Barak Barraza Rep   11/06/23 10:25 EST

## 2024-05-02 ENCOUNTER — LAB (OUTPATIENT)
Dept: LAB | Facility: HOSPITAL | Age: 69
End: 2024-05-02
Payer: MEDICARE

## 2024-05-02 DIAGNOSIS — E03.9 ACQUIRED HYPOTHYROIDISM: ICD-10-CM

## 2024-05-02 LAB
T4 FREE SERPL-MCNC: 1.28 NG/DL (ref 0.93–1.7)
TSH SERPL DL<=0.05 MIU/L-ACNC: 1.14 UIU/ML (ref 0.27–4.2)

## 2024-05-02 PROCEDURE — 84443 ASSAY THYROID STIM HORMONE: CPT

## 2024-05-02 PROCEDURE — 84439 ASSAY OF FREE THYROXINE: CPT

## 2024-05-02 PROCEDURE — 36415 COLL VENOUS BLD VENIPUNCTURE: CPT

## 2024-05-20 ENCOUNTER — HOSPITAL ENCOUNTER (OUTPATIENT)
Dept: BONE DENSITY | Facility: HOSPITAL | Age: 69
Discharge: HOME | End: 2024-05-20
Payer: MEDICARE

## 2024-05-20 ENCOUNTER — HOSPITAL ENCOUNTER (OUTPATIENT)
Dept: MAMMOGRAPHY | Facility: HOSPITAL | Age: 69
Discharge: HOME | End: 2024-05-20
Payer: MEDICARE

## 2024-05-20 DIAGNOSIS — Z78.0 ASYMPTOMATIC POSTMENOPAUSAL STATE: ICD-10-CM

## 2024-05-20 DIAGNOSIS — Z12.31 BREAST CANCER SCREENING BY MAMMOGRAM: ICD-10-CM

## 2024-05-20 PROCEDURE — 77080 DXA BONE DENSITY AXIAL: CPT

## 2024-05-20 PROCEDURE — 77067 SCR MAMMO BI INCL CAD: CPT

## 2024-05-20 PROCEDURE — 77063 BREAST TOMOSYNTHESIS BI: CPT

## 2024-05-21 DIAGNOSIS — Z78.0 POSTMENOPAUSAL STATE: Primary | ICD-10-CM

## 2024-05-22 ENCOUNTER — TELEPHONE (OUTPATIENT)
Dept: FAMILY MEDICINE CLINIC | Age: 69
End: 2024-05-22
Payer: MEDICARE

## 2024-05-22 NOTE — TELEPHONE ENCOUNTER
Caller: Radha Mittal    Relationship to patient: Self    Best call back number:     [phone] (Mobile)       Patient is needing: PATIENT CALLED IN AND SAID DR. EMERY IS WANTING HER TO HAVE ANOTHER DEXA SCAN AND WHEN SHE WENT TO SCHEDULE IT, SHE WAS TOLD THEY NEED A NEW ORDER THAT SAYS HOSPITAL AND NOT CLINICAL IN ORDER TO SCHEDULE NEW DEXASCAN. PATIENT IS REQUESTING A CALL BACK PLEASE.

## 2024-06-04 ENCOUNTER — HOSPITAL ENCOUNTER (OUTPATIENT)
Dept: BONE DENSITY | Facility: HOSPITAL | Age: 69
Discharge: HOME | End: 2024-06-04
Payer: MEDICARE

## 2024-06-04 DIAGNOSIS — Z78.0 POSTMENOPAUSAL STATE: ICD-10-CM

## 2024-06-04 PROCEDURE — 77080 DXA BONE DENSITY AXIAL: CPT

## 2024-08-23 ENCOUNTER — TELEPHONE (OUTPATIENT)
Dept: FAMILY MEDICINE CLINIC | Age: 69
End: 2024-08-23
Payer: MEDICARE

## 2024-08-23 NOTE — TELEPHONE ENCOUNTER
Caller: Kyrie Radha DAVID    Relationship: Self    Best call back number: [phone]    What is the best time to reach you: ANYTIME ON HER CELL NUMBER    Who are you requesting to speak with (clinical staff, provider,  specific staff member): FAUZIA    Do you know the name of the person who called: PATIENT     What was the call regarding: PATIENT HAS APPT FOR MONDAY FOR ANXIETY ISSUES. PATIENT WOULD LIKE TO DISCUSS WITH FAUZIA WHO WOULD BE BEST FOR HER TO SEE FOR THESE ISSUES OR WORK HER IN WITH DR EMERY. PLEASE CALL PATIENT BACK AND ADVISE.    Is it okay if the provider responds through MyChart: N/A

## 2024-08-26 ENCOUNTER — LAB (OUTPATIENT)
Dept: LAB | Facility: HOSPITAL | Age: 69
End: 2024-08-26
Payer: MEDICARE

## 2024-08-26 ENCOUNTER — OFFICE VISIT (OUTPATIENT)
Dept: FAMILY MEDICINE CLINIC | Age: 69
End: 2024-08-26
Payer: MEDICARE

## 2024-08-26 VITALS
SYSTOLIC BLOOD PRESSURE: 155 MMHG | DIASTOLIC BLOOD PRESSURE: 80 MMHG | HEIGHT: 59 IN | HEART RATE: 82 BPM | TEMPERATURE: 98.7 F | BODY MASS INDEX: 24.23 KG/M2 | WEIGHT: 120.2 LBS | OXYGEN SATURATION: 100 %

## 2024-08-26 DIAGNOSIS — M79.672 BILATERAL FOOT PAIN: ICD-10-CM

## 2024-08-26 DIAGNOSIS — R53.81 MALAISE: Primary | ICD-10-CM

## 2024-08-26 DIAGNOSIS — E78.00 HYPERCHOLESTEROLEMIA: ICD-10-CM

## 2024-08-26 DIAGNOSIS — M79.671 BILATERAL FOOT PAIN: ICD-10-CM

## 2024-08-26 DIAGNOSIS — E03.9 ACQUIRED HYPOTHYROIDISM: ICD-10-CM

## 2024-08-26 DIAGNOSIS — R53.81 MALAISE: ICD-10-CM

## 2024-08-26 DIAGNOSIS — D51.9 ANEMIA DUE TO VITAMIN B12 DEFICIENCY, UNSPECIFIED B12 DEFICIENCY TYPE: ICD-10-CM

## 2024-08-26 DIAGNOSIS — F41.1 GENERALIZED ANXIETY DISORDER: ICD-10-CM

## 2024-08-26 DIAGNOSIS — I10 ESSENTIAL HYPERTENSION: ICD-10-CM

## 2024-08-26 LAB
DEPRECATED RDW RBC AUTO: 46.4 FL (ref 37–54)
ERYTHROCYTE [DISTWIDTH] IN BLOOD BY AUTOMATED COUNT: 15.2 % (ref 12.3–15.4)
HCT VFR BLD AUTO: 45.5 % (ref 34–46.6)
HGB BLD-MCNC: 13.9 G/DL (ref 12–15.9)
MCH RBC QN AUTO: 25.4 PG (ref 26.6–33)
MCHC RBC AUTO-ENTMCNC: 30.5 G/DL (ref 31.5–35.7)
MCV RBC AUTO: 83 FL (ref 79–97)
PLATELET # BLD AUTO: 361 10*3/MM3 (ref 140–450)
PMV BLD AUTO: 8.9 FL (ref 6–12)
RBC # BLD AUTO: 5.48 10*6/MM3 (ref 3.77–5.28)
WBC NRBC COR # BLD AUTO: 8.06 10*3/MM3 (ref 3.4–10.8)

## 2024-08-26 PROCEDURE — 85027 COMPLETE CBC AUTOMATED: CPT

## 2024-08-26 PROCEDURE — 80053 COMPREHEN METABOLIC PANEL: CPT

## 2024-08-26 PROCEDURE — 3078F DIAST BP <80 MM HG: CPT | Performed by: FAMILY MEDICINE

## 2024-08-26 PROCEDURE — 86140 C-REACTIVE PROTEIN: CPT

## 2024-08-26 PROCEDURE — 80061 LIPID PANEL: CPT

## 2024-08-26 PROCEDURE — 82607 VITAMIN B-12: CPT

## 2024-08-26 PROCEDURE — 36415 COLL VENOUS BLD VENIPUNCTURE: CPT

## 2024-08-26 PROCEDURE — 99214 OFFICE O/P EST MOD 30 MIN: CPT | Performed by: FAMILY MEDICINE

## 2024-08-26 PROCEDURE — 85652 RBC SED RATE AUTOMATED: CPT

## 2024-08-26 PROCEDURE — 84443 ASSAY THYROID STIM HORMONE: CPT

## 2024-08-26 PROCEDURE — 1159F MED LIST DOCD IN RCRD: CPT | Performed by: FAMILY MEDICINE

## 2024-08-26 PROCEDURE — 1160F RVW MEDS BY RX/DR IN RCRD: CPT | Performed by: FAMILY MEDICINE

## 2024-08-26 PROCEDURE — 84550 ASSAY OF BLOOD/URIC ACID: CPT

## 2024-08-26 PROCEDURE — 84439 ASSAY OF FREE THYROXINE: CPT

## 2024-08-26 PROCEDURE — 3077F SYST BP >= 140 MM HG: CPT | Performed by: FAMILY MEDICINE

## 2024-08-26 PROCEDURE — G2211 COMPLEX E/M VISIT ADD ON: HCPCS | Performed by: FAMILY MEDICINE

## 2024-08-26 PROCEDURE — 1126F AMNT PAIN NOTED NONE PRSNT: CPT | Performed by: FAMILY MEDICINE

## 2024-08-26 PROCEDURE — 82746 ASSAY OF FOLIC ACID SERUM: CPT

## 2024-08-26 RX ORDER — VENLAFAXINE HYDROCHLORIDE 37.5 MG/1
37.5 CAPSULE, EXTENDED RELEASE ORAL DAILY
Qty: 30 CAPSULE | Refills: 1 | Status: SHIPPED | OUTPATIENT
Start: 2024-08-26

## 2024-08-26 NOTE — PROGRESS NOTES
Radha Mittal presents to [facility].    Chief Complaint:  'Issues with my feet'    Subjective   History of Present Illness:  Radha is being seen today for follow up on her feet.  She has been seeing podiatry and states that she had injection recently.  She has noted problems with blistering on the bottom of her feet.  She has also been dealing with a problem with a tooth.  She says that she feels kind of defeated right now.  She is having a cool feeling of her hands and feet.  She is unsure if these may reflect a problem with her thyroid or some other issue.  She has not been able to walk regularly because of her feet.  She felt better when she was walking.  She has lost about 6 pounds since her most recent visit here.    Her  is with her and expresses concern that she seems overwhelmed with stress and worry.  She is also feeling paranoid.  She is having difficulty with sleep.  Her blood pressure is elevated here.  She says it was better at home but still above goal.    Review of Systems:  Review of Systems   Constitutional:  Negative for chills (not chills - cold) and fever.   Respiratory:  Negative for cough and shortness of breath.    Cardiovascular:  Negative for chest pain and palpitations.   Gastrointestinal:  Positive for nausea. Negative for abdominal pain and vomiting.      Objective   Medical History:  Past Medical History:    Allergic    Depression    Disease of thyroid gland    Diverticulosis    Hemorrhoids    Hypertension    Polyp of hepatic flexure of colon     Past Surgical History:    CARDIAC ABLATION     SECTION    x2    COLONOSCOPY    Procedure: COLONOSCOPY;  Surgeon: Garth De La Torre MD;  [place] [facility] [place]  Service: Gastroenterology;  Laterality: N/A;  HEMORRHOIDS, DIVERTICULOSIS    COLONOSCOPY W/ BIOPSIES AND POLYPECTOMY      Family History   Problem Relation Age of Onset    Anxiety disorder Mother     Hyperlipidemia Mother     Thyroid  disease Mother     Depression Father     Hyperlipidemia Father     Hyperlipidemia Sister     Thyroid disease Sister      Social History     Tobacco Use    Smoking status: Never    Smokeless tobacco: Never   Substance Use Topics    Alcohol use: Not Currently       Health Maintenance Due   Topic Date Due    TDAP/TD VACCINES (1 - Tdap) Never done    LIPID PANEL  02/21/2024    INFLUENZA VACCINE  08/01/2024    ANNUAL WELLNESS VISIT  11/02/2024        Immunization History   Administered Date(s) Administered    COVID-19 (PFIZER) Purple Cap Monovalent 09/10/2021, 10/01/2021    Flu Vaccine Split Quad 10/19/2020    Fluzone (or Fluarix & Flulaval for VFC) >6mos 10/19/2020    Fluzone High-Dose 65+yrs 10/19/2021    Pneumococcal Polysaccharide (PPSV23) 04/26/2021    Shingrix 07/22/2020, 10/19/2020    Zostavax 12/20/2013       Allergies   Allergen Reactions    Latex Rash    Sulfa Antibiotics Unknown - Low Severity        Medications:  Current Outpatient Medications on File Prior to Visit   Medication Sig    alendronate (FOSAMAX) 70 MG tablet Take 1 tablet by mouth Every 7 (Seven) Days.    aspirin 81 MG EC tablet Aspir-81 81 mg oral tablet,delayed release (DR/EC) take 1 tablet (81 mg) by oral route once daily   Active    calcium carbonate (OS-DIANA) 1250 (500 Ca) MG tablet Calcium 500 500 mg calcium (1,250 mg) oral tablet take 1 tablet by oral route daily   Active    Cholecalciferol 25 MCG (1000 UT) capsule Vitamin D3 1,000 unit oral capsule take 1 capsule by oral route daily   Active    ferrous sulfate 325 (65 FE) MG tablet Take 1 tablet by mouth Daily.    levothyroxine (SYNTHROID, LEVOTHROID) 125 MCG tablet Take 0.5 tablets by mouth Every Morning.    Magnesium 250 MG tablet Take  by mouth Every Other Day.    metoprolol succinate XL (TOPROL-XL) 25 MG 24 hr tablet Take 1 tablet by mouth Every Night.    vitamin B-12 (CYANOCOBALAMIN) 1000 MCG tablet Take 1 tablet by mouth Every Other Day.    [DISCONTINUED] propranolol (INDERAL) 10 MG  "tablet Take 10 mg by mouth Daily.     No current facility-administered medications on file prior to visit.       Vital Signs:   Vitals:    08/26/24 1139 08/26/24 1224   BP: 155/78 155/80   BP Location: Right arm Right arm   Patient Position: Sitting Sitting   Pulse: 85 82   Temp: 98.7 °F (37.1 °C)    TempSrc: Oral    SpO2: 100%    Weight: 54.5 kg (120 lb 3.2 oz)    Height: 149.9 cm (59.02\")    Body mass index is 24.26 kg/m².    Physical Exam:  Physical Exam  Vitals and nursing note reviewed.   Constitutional:       General: She is not in acute distress.     Appearance: She is not ill-appearing.   HENT:      Right Ear: Tympanic membrane and ear canal normal.      Left Ear: Tympanic membrane and ear canal normal.      Mouth/Throat:      Mouth: Mucous membranes are moist.      Comments: Pharynx appears normal  Eyes:      Extraocular Movements: Extraocular movements intact.      Pupils: Pupils are equal, round, and reactive to light.   Neck:      Thyroid: No thyromegaly.   Cardiovascular:      Rate and Rhythm: Normal rate and regular rhythm.      Heart sounds: No murmur heard.  Pulmonary:      Effort: Pulmonary effort is normal.      Breath sounds: Normal breath sounds.   Abdominal:      General: There is no distension.      Palpations: Abdomen is soft. There is no mass.      Tenderness: There is no abdominal tenderness.   Musculoskeletal:      Cervical back: Normal range of motion.   Skin:     Findings: No lesion or rash.   Neurological:      General: No focal deficit present.      Mental Status: She is oriented to person, place, and time.      Cranial Nerves: No cranial nerve deficit.   Psychiatric:         Mood and Affect: Affect is flat.     Result Review   The following data was reviewed by Edwin Melissa MD on 08/26/2024.  Lab Results   Component Value Date    WBC 5.34 11/02/2023    HGB 13.8 11/02/2023    HCT 44.7 11/02/2023    MCV 81.1 11/02/2023     11/02/2023     Lab Results   Component Value " Date    GLUCOSE 98 11/02/2023    BUN 11 11/02/2023    CREATININE 0.72 11/02/2023     11/02/2023    K 4.3 11/02/2023     11/02/2023    CO2 28.0 11/02/2023    CALCIUM 9.2 11/02/2023    PROTEINTOT 6.9 11/02/2023    ALBUMIN 4.7 11/02/2023    ALT 16 11/02/2023    AST 16 11/02/2023    ALKPHOS 34 (L) 11/02/2023    BILITOT 0.3 11/02/2023    EGFR 91.8 11/02/2023    GLOB 2.2 11/02/2023    AGRATIO 2.1 11/02/2023    BCR 15.3 11/02/2023    ANIONGAP 8.0 11/02/2023      Lab Results   Component Value Date    CHOL 212 (H) 02/21/2023    CHLPL 218 (H) 04/26/2021    TRIG 82 02/21/2023    HDL 57 02/21/2023     (H) 02/21/2023     Lab Results   Component Value Date    TSH 1.140 05/02/2024     Lab Results   Component Value Date    HGBA1C 5.60 02/21/2023     BMI is within normal parameters. No other follow-up for BMI required.         Assessment and Plan:   Today, we have reviewed her care.  Radha has been struggling for some time now.  She does not feel well, and has noted increased anxiety and stress.  Her physical exam is mostly reassuring.  Her blood pressure is mildly elevated, but it has been in a good range at home.  Based on her problems and our discussion, we will move ahead with laboratory evaluation as noted.  I do think it is likely there is a significant anxiety component in play.  Therefore, we will start her on a low-dose of Effexor XR and see how she responds to this.  Will reach out to her regarding her testing tomorrow.    Diagnoses and all orders for this visit:    1. Malaise (Primary)  -     CBC (No Diff); Future  -     Vitamin B12 & Folate; Future    2. Acquired hypothyroidism  -     TSH+Free T4; Future    3. Generalized anxiety disorder  -     venlafaxine XR (Effexor XR) 37.5 MG 24 hr capsule; Take 1 capsule by mouth Daily.  Dispense: 30 capsule; Refill: 1    4. Anemia due to vitamin B12 deficiency, unspecified B12 deficiency type  -     CBC (No Diff); Future  -     Vitamin B12 & Folate; Future    5.  Hypercholesterolemia  -     Comprehensive Metabolic Panel; Future  -     Lipid Panel; Future    6. Essential hypertension  -     Comprehensive Metabolic Panel; Future    7. Bilateral foot pain  -     Sedimentation Rate; Future  -     C-reactive protein; Future  -     Uric Acid; Future    Follow Up  Return in about 10 weeks (around 11/4/2024).  Patient was given instructions and counseling regarding her condition or for health maintenance advice. Please see specific information pulled into the AVS if appropriate.

## 2024-08-27 LAB
ALBUMIN SERPL-MCNC: 4.4 G/DL (ref 3.5–5.2)
ALBUMIN/GLOB SERPL: 1.9 G/DL
ALP SERPL-CCNC: 33 U/L (ref 39–117)
ALT SERPL W P-5'-P-CCNC: 21 U/L (ref 1–33)
ANION GAP SERPL CALCULATED.3IONS-SCNC: 9.7 MMOL/L (ref 5–15)
AST SERPL-CCNC: 20 U/L (ref 1–32)
BILIRUB SERPL-MCNC: 0.2 MG/DL (ref 0–1.2)
BUN SERPL-MCNC: 10 MG/DL (ref 8–23)
BUN/CREAT SERPL: 14.1 (ref 7–25)
CALCIUM SPEC-SCNC: 9.4 MG/DL (ref 8.6–10.5)
CHLORIDE SERPL-SCNC: 103 MMOL/L (ref 98–107)
CHOLEST SERPL-MCNC: 198 MG/DL (ref 0–200)
CO2 SERPL-SCNC: 28.3 MMOL/L (ref 22–29)
CREAT SERPL-MCNC: 0.71 MG/DL (ref 0.57–1)
CRP SERPL-MCNC: <0.3 MG/DL (ref 0–0.5)
EGFRCR SERPLBLD CKD-EPI 2021: 92.7 ML/MIN/1.73
ERYTHROCYTE [SEDIMENTATION RATE] IN BLOOD: 1 MM/HR (ref 0–30)
FOLATE SERPL-MCNC: >20 NG/ML (ref 4.78–24.2)
GLOBULIN UR ELPH-MCNC: 2.3 GM/DL
GLUCOSE SERPL-MCNC: 94 MG/DL (ref 65–99)
HDLC SERPL-MCNC: 55 MG/DL (ref 40–60)
LDLC SERPL CALC-MCNC: 122 MG/DL (ref 0–100)
LDLC/HDLC SERPL: 2.18 {RATIO}
POTASSIUM SERPL-SCNC: 4.3 MMOL/L (ref 3.5–5.2)
PROT SERPL-MCNC: 6.7 G/DL (ref 6–8.5)
SODIUM SERPL-SCNC: 141 MMOL/L (ref 136–145)
T4 FREE SERPL-MCNC: 1.38 NG/DL (ref 0.92–1.68)
TRIGL SERPL-MCNC: 116 MG/DL (ref 0–150)
TSH SERPL DL<=0.05 MIU/L-ACNC: 6.31 UIU/ML (ref 0.27–4.2)
URATE SERPL-MCNC: 2.9 MG/DL (ref 2.4–5.7)
VIT B12 BLD-MCNC: 936 PG/ML (ref 211–946)
VLDLC SERPL-MCNC: 21 MG/DL (ref 5–40)

## 2024-08-27 RX ORDER — LEVOTHYROXINE SODIUM 75 UG/1
75 TABLET ORAL
Qty: 90 TABLET | Refills: 3 | Status: SHIPPED | OUTPATIENT
Start: 2024-08-27

## 2024-08-28 ENCOUNTER — PATIENT MESSAGE (OUTPATIENT)
Dept: FAMILY MEDICINE CLINIC | Age: 69
End: 2024-08-28
Payer: MEDICARE

## 2024-09-13 ENCOUNTER — LAB (OUTPATIENT)
Dept: LAB | Facility: HOSPITAL | Age: 69
End: 2024-09-13
Payer: MEDICARE

## 2024-09-13 ENCOUNTER — TELEPHONE (OUTPATIENT)
Dept: FAMILY MEDICINE CLINIC | Age: 69
End: 2024-09-13
Payer: MEDICARE

## 2024-09-13 DIAGNOSIS — R30.0 DYSURIA: ICD-10-CM

## 2024-09-13 DIAGNOSIS — R30.0 DYSURIA: Primary | ICD-10-CM

## 2024-09-13 LAB
BACTERIA UR QL AUTO: NORMAL /HPF
BILIRUB UR QL STRIP: NEGATIVE
CLARITY UR: CLEAR
COLOR UR: YELLOW
GLUCOSE UR STRIP-MCNC: NEGATIVE MG/DL
HGB UR QL STRIP.AUTO: NEGATIVE
HYALINE CASTS UR QL AUTO: NORMAL /LPF
KETONES UR QL STRIP: NEGATIVE
LEUKOCYTE ESTERASE UR QL STRIP.AUTO: ABNORMAL
NITRITE UR QL STRIP: NEGATIVE
PH UR STRIP.AUTO: 8 [PH] (ref 5–8)
PROT UR QL STRIP: NEGATIVE
RBC # UR STRIP: NORMAL /HPF
REF LAB TEST METHOD: NORMAL
SP GR UR STRIP: 1.01 (ref 1–1.03)
SQUAMOUS #/AREA URNS HPF: NORMAL /HPF
UROBILINOGEN UR QL STRIP: ABNORMAL
WBC # UR STRIP: NORMAL /HPF

## 2024-09-13 PROCEDURE — 81001 URINALYSIS AUTO W/SCOPE: CPT

## 2024-09-13 NOTE — TELEPHONE ENCOUNTER
Caller: Radha Mittal     Relationship: [unfilled]     Best call back number: [phone]    What is your medical concern? STILL KIND OF HAVING THE SAME SYMPTOM AS BEFORE THE OTC CREAM FOR YEAST INFECTIONS HAS HELPED BUT NOT TOTALLY CLEARED UP THE PROBLEM SHE THINKS NOW IT COULD POSSIBLY BE A UTI SHE STILL HAS OTC MEDICATION FOR A UTI PLEASE CONTACT AND ADVISE WHAT SHE SHOULD DO AND IF SHE NEEDS TO BE SEEN       Is your provider already aware of this issue? YES    Have you been treated for this issue? YES

## 2024-09-18 DIAGNOSIS — I10 ESSENTIAL HYPERTENSION: ICD-10-CM

## 2024-09-18 RX ORDER — METOPROLOL SUCCINATE 25 MG/1
25 TABLET, EXTENDED RELEASE ORAL NIGHTLY
Qty: 90 TABLET | Refills: 0 | Status: SHIPPED | OUTPATIENT
Start: 2024-09-18

## 2024-09-20 DIAGNOSIS — M81.0 POSTMENOPAUSAL OSTEOPOROSIS: ICD-10-CM

## 2024-09-20 RX ORDER — ALENDRONATE SODIUM 70 MG/1
70 TABLET ORAL
Qty: 13 TABLET | Refills: 3 | Status: SHIPPED | OUTPATIENT
Start: 2024-09-20

## 2024-10-11 ENCOUNTER — TELEPHONE (OUTPATIENT)
Dept: FAMILY MEDICINE CLINIC | Age: 69
End: 2024-10-11
Payer: MEDICARE

## 2024-10-11 DIAGNOSIS — F41.1 GENERALIZED ANXIETY DISORDER: ICD-10-CM

## 2024-10-11 RX ORDER — PAROXETINE 20 MG/1
20 TABLET, FILM COATED ORAL EVERY MORNING
Qty: 90 TABLET | Refills: 1 | Status: SHIPPED | OUTPATIENT
Start: 2024-10-11

## 2024-10-11 NOTE — TELEPHONE ENCOUNTER
Pt states she is tolerating med ok, but would like to try the 20mg dose. She states it does seem to be helping some. Rx pended

## 2024-10-11 NOTE — TELEPHONE ENCOUNTER
----- Message from Estrella BOBO sent at 9/20/2024  1:15 PM EDT -----   TICKLE to call her in 3 weeks to see how she is doing with paroxetine.  Is it helpful?  If she tolerating it okay?  Should we consider increasing to 20 mg daily or continue 10 mg daily longer-term?

## 2024-11-04 ENCOUNTER — OFFICE VISIT (OUTPATIENT)
Dept: FAMILY MEDICINE CLINIC | Age: 69
End: 2024-11-04
Payer: MEDICARE

## 2024-11-04 VITALS
BODY MASS INDEX: 23.87 KG/M2 | HEART RATE: 80 BPM | TEMPERATURE: 98.3 F | SYSTOLIC BLOOD PRESSURE: 134 MMHG | WEIGHT: 118.4 LBS | OXYGEN SATURATION: 96 % | DIASTOLIC BLOOD PRESSURE: 86 MMHG | HEIGHT: 59 IN

## 2024-11-04 DIAGNOSIS — M81.0 POSTMENOPAUSAL OSTEOPOROSIS: ICD-10-CM

## 2024-11-04 DIAGNOSIS — I10 ESSENTIAL HYPERTENSION: ICD-10-CM

## 2024-11-04 DIAGNOSIS — E03.9 ACQUIRED HYPOTHYROIDISM: ICD-10-CM

## 2024-11-04 DIAGNOSIS — Z00.00 PHYSICAL EXAM: Primary | ICD-10-CM

## 2024-11-04 DIAGNOSIS — Z12.31 ENCOUNTER FOR SCREENING MAMMOGRAM FOR MALIGNANT NEOPLASM OF BREAST: ICD-10-CM

## 2024-11-04 PROCEDURE — 3075F SYST BP GE 130 - 139MM HG: CPT | Performed by: FAMILY MEDICINE

## 2024-11-04 PROCEDURE — 96160 PT-FOCUSED HLTH RISK ASSMT: CPT | Performed by: FAMILY MEDICINE

## 2024-11-04 PROCEDURE — 1160F RVW MEDS BY RX/DR IN RCRD: CPT | Performed by: FAMILY MEDICINE

## 2024-11-04 PROCEDURE — 3079F DIAST BP 80-89 MM HG: CPT | Performed by: FAMILY MEDICINE

## 2024-11-04 PROCEDURE — 99214 OFFICE O/P EST MOD 30 MIN: CPT | Performed by: FAMILY MEDICINE

## 2024-11-04 PROCEDURE — 1159F MED LIST DOCD IN RCRD: CPT | Performed by: FAMILY MEDICINE

## 2024-11-04 PROCEDURE — 1170F FXNL STATUS ASSESSED: CPT | Performed by: FAMILY MEDICINE

## 2024-11-04 PROCEDURE — 1126F AMNT PAIN NOTED NONE PRSNT: CPT | Performed by: FAMILY MEDICINE

## 2024-11-04 PROCEDURE — G0439 PPPS, SUBSEQ VISIT: HCPCS | Performed by: FAMILY MEDICINE

## 2024-11-04 RX ORDER — METOPROLOL SUCCINATE 25 MG/1
25 TABLET, EXTENDED RELEASE ORAL NIGHTLY
Qty: 90 TABLET | Refills: 3 | Status: SHIPPED | OUTPATIENT
Start: 2024-11-04

## 2024-11-04 NOTE — PROGRESS NOTES
The ABCs of the Annual Wellness Visit  Subsequent Medicare Wellness Visit    Subjective    Radha Mittal is a 69 y.o. patient who presents for a Subsequent Medicare Wellness Visit.    The following portions of the patient's history were reviewed and updated as appropriate: allergies, current medications, past family history, past medical history, past social history, past surgical history, and problem list.    Compared to one year ago, the patient feels her physical health is worse.  She attributes this to increased aches and pains.    Compared to one year ago, the patient feels her mental health is the same.    Recent Hospitalizations:  She was not admitted to the hospital during the last year.     Current Medical Providers:  Patient Care Team:  Edwin Melissa MD as PCP - General (Family Medicine)    Outpatient Medications Prior to Visit   Medication Sig Dispense Refill    alendronate (FOSAMAX) 70 MG tablet Take 1 tablet by mouth Every 7 (Seven) Days. 13 tablet 3    aspirin 81 MG EC tablet Aspir-81 81 mg oral tablet,delayed release (DR/EC) take 1 tablet (81 mg) by oral route once daily   Active      calcium carbonate (OS-DIANA) 1250 (500 Ca) MG tablet Calcium 500 500 mg calcium (1,250 mg) oral tablet take 1 tablet by oral route daily   Active      Cholecalciferol 25 MCG (1000 UT) capsule Vitamin D3 1,000 unit oral capsule take 1 capsule by oral route daily   Active      ferrous sulfate 325 (65 FE) MG tablet Take 1 tablet by mouth Daily. 90 tablet 1    levothyroxine (SYNTHROID, LEVOTHROID) 75 MCG tablet Take 1 tablet by mouth Every Morning. 90 tablet 3    Magnesium 250 MG tablet Take  by mouth Every Other Day.      PARoxetine (PAXIL) 20 MG tablet Take 1 tablet by mouth Every Morning. 90 tablet 1    vitamin B-12 (CYANOCOBALAMIN) 1000 MCG tablet Take 1 tablet by mouth Every Other Day.      metoprolol succinate XL (TOPROL-XL) 25 MG 24 hr tablet Take 1 tablet by mouth Every Night. 90 tablet 0     No  "facility-administered medications prior to visit.       No opioid medication identified on active medication list. I have reviewed chart for other potential  high risk medication/s and harmful drug interactions in the elderly.      Aspirin is on active medication list. Aspirin use is indicated based on review of current medical condition/s. Pros and cons of this therapy have been discussed today. Benefits of this medication outweigh potential harm.  Patient has been encouraged to continue taking this medication.      Patient Active Problem List   Diagnosis    Hypercholesterolemia    Hypothyroidism    Rash    Vitamin B12 deficiency anemia    Constipation    Encounter for screening for malignant neoplasm of colon    Essential hypertension    Other specified anxiety disorders     Advance Care Planning  Advance Directive is on file.  ACP discussion was held with the patient during this visit. Patient has an advance directive in EMR which is still valid.      Objective    Vitals:    11/04/24 1057   BP: 148/84   BP Location: Right arm   Patient Position: Sitting   Pulse: 76   Temp: 98.3 °F (36.8 °C)   TempSrc: Oral   SpO2: 96%   Weight: 53.7 kg (118 lb 6.4 oz)   Height: 149.9 cm (59.02\")   PainSc: 0-No pain     Estimated body mass index is 23.9 kg/m² as calculated from the following:    Height as of this encounter: 149.9 cm (59.02\").    Weight as of this encounter: 53.7 kg (118 lb 6.4 oz).    BMI is within normal parameters. No other follow-up for BMI required.    Does the patient have evidence of cognitive impairment? No    Lab Results   Component Value Date    TRIG 116 08/26/2024    HDL 55 08/26/2024     (H) 08/26/2024    VLDL 21 08/26/2024        HEALTH RISK ASSESSMENT    Smoking Status:  Social History     Tobacco Use   Smoking Status Never   Smokeless Tobacco Never     Alcohol Consumption:  Social History     Substance and Sexual Activity   Alcohol Use Not Currently     Fall Risk Screen:    STEADI Fall Risk " Assessment was completed, and patient is at LOW risk for falls.Assessment completed on:2024    Depression Screenin/4/2024    10:56 AM   PHQ-2/PHQ-9 Depression Screening   Little interest or pleasure in doing things Not at all   Feeling down, depressed, or hopeless Not at all       Health Habits and Functional and Cognitive Screenin/4/2024    10:56 AM   Functional & Cognitive Status   Do you have difficulty preparing food and eating? No   Do you have difficulty bathing yourself, getting dressed or grooming yourself? No   Do you have difficulty using the toilet? No   Do you have difficulty moving around from place to place? No   Do you have trouble with steps or getting out of a bed or a chair? No   Current Diet Well Balanced Diet   Dental Exam Up to date   Eye Exam Up to date   Exercise (times per week) 0 times per week   Current Exercises Include No Regular Exercise   Do you need help using the phone?  No   Are you deaf or do you have serious difficulty hearing?  No   Do you need help to go to places out of walking distance? No   Do you need help shopping? No   Do you need help preparing meals?  No   Do you need help with housework?  No   Do you need help with laundry? No   Do you need help taking your medications? No   Do you need help managing money? No   Do you ever drive or ride in a car without wearing a seat belt? No   Have you felt unusual stress, anger or loneliness in the last month? No   Who do you live with? Spouse   If you need help, do you have trouble finding someone available to you? No   Have you been bothered in the last four weeks by sexual problems? No   Do you have difficulty concentrating, remembering or making decisions? No       Age-appropriate Screening Schedule:  Refer to the list below for future screening recommendations based on patient's age, sex and/or medical conditions. Orders for these recommended tests are listed in the plan section. The patient has been  provided with a written plan.    Health Maintenance   Topic Date Due    TDAP/TD VACCINES (1 - Tdap) Never done    INFLUENZA VACCINE  03/31/2025 (Originally 8/1/2024)    COVID-19 Vaccine (3 - 2024-25 season) 11/04/2025 (Originally 9/1/2024)    Pneumococcal Vaccine 65+ (2 of 2 - PCV) 11/04/2025 (Originally 4/26/2022)    LIPID PANEL  08/26/2025    ANNUAL WELLNESS VISIT  11/04/2025    MAMMOGRAM  05/20/2026    DXA SCAN  06/04/2026    COLORECTAL CANCER SCREENING  08/26/2027    HEPATITIS C SCREENING  Completed    ZOSTER VACCINE  Completed          CMS Preventative Services Quick Reference  Risk Factors Identified During Encounter  Depression/Dysphoria: Current medication is effective, no change recommended  Immunizations Discussed/Encouraged: Tdap, Influenza, Prevnar 20 (Pneumococcal 20-valent conjugate), Shingrix, COVID19, and RSV (Respiratory Syncytial Virus)  The above risks/problems have been discussed with the patient.  Pertinent information has been shared with the patient in the After Visit Summary.  An After Visit Summary and PPPS were made available to the patient.    Follow Up:   Next Medicare Wellness visit to be scheduled in 1 year.     Additional E&M Note during same encounter follows:  Patient has multiple medical problems which are significant and separately identifiable that require additional work above and beyond the Medicare Wellness Visit.      Chief Complaint:  Thyroid, blood pressure, anxiety    Subjective    History of Present Illness:  In addition to the Medicare wellness exam, Radha is also here for follow-up on her usual care.  She has hypothyroidism for which she currently takes levothyroxine 75 mcg daily.  Her most recent TSH was mildly elevated.  We did increase her dose after her most recent TSH.  There has apparently been some confusion at [facility] regarding the dose though.    She also has hypertension.  Her current treatment includes metoprolol succinate 25 mg daily.  Her blood pressure is  "just above goal on initial check.  She brings in a log of her blood pressures.  They are well-controlled at home.  The pressure typically runs in the 110s or 120s there.    Review of Systems:  Review of Systems   Constitutional:  Negative for chills and fever.   Respiratory:  Negative for cough and shortness of breath.    Cardiovascular:  Negative for chest pain and palpitations.   Gastrointestinal:  Negative for abdominal pain, nausea and vomiting.      Objective   Vital Signs:  Vitals:    11/04/24 1057 11/04/24 1140   BP: 148/84 134/86   BP Location: Right arm Right arm   Patient Position: Sitting Sitting   Pulse: 76 80   Temp: 98.3 °F (36.8 °C)    TempSrc: Oral    SpO2: 96%    Weight: 53.7 kg (118 lb 6.4 oz)    Height: 149.9 cm (59.02\")    PainSc: 0-No pain    Body mass index is 23.9 kg/m².    Physical Exam  Vitals and nursing note reviewed.   Constitutional:       General: She is not in acute distress.     Appearance: She is not ill-appearing.   HENT:      Right Ear: Tympanic membrane and ear canal normal.      Left Ear: Tympanic membrane and ear canal normal.      Ears:      Comments: Hearing is normal with forced whisper bilaterally.     Mouth/Throat:      Mouth: Mucous membranes are moist.      Comments: Pharynx appears normal  Eyes:      Extraocular Movements: Extraocular movements intact.      Pupils: Pupils are equal, round, and reactive to light.      Comments: Binocular vision is 20/20 with correction.   Neck:      Thyroid: No thyromegaly.   Cardiovascular:      Rate and Rhythm: Normal rate and regular rhythm.      Heart sounds: No murmur heard.  Pulmonary:      Effort: Pulmonary effort is normal.      Breath sounds: Normal breath sounds.   Abdominal:      General: There is no distension.      Palpations: Abdomen is soft. There is no mass.      Tenderness: There is no abdominal tenderness.   Musculoskeletal:      Cervical back: Normal range of motion.   Skin:     Findings: No lesion or rash. "   Neurological:      General: No focal deficit present.      Mental Status: She is oriented to person, place, and time.      Cranial Nerves: No cranial nerve deficit.      Motor: No weakness.      Coordination: Coordination normal.   Psychiatric:         Mood and Affect: Mood normal.     The following data was reviewed by Edwin Melissa MD on 11/04/2024.  Lab Results   Component Value Date    WBC 8.06 08/26/2024    HGB 13.9 08/26/2024    HCT 45.5 08/26/2024    MCV 83.0 08/26/2024     08/26/2024     Lab Results   Component Value Date    GLUCOSE 94 08/26/2024    BUN 10 08/26/2024    CREATININE 0.71 08/26/2024     08/26/2024    K 4.3 08/26/2024     08/26/2024    CO2 28.3 08/26/2024    CALCIUM 9.4 08/26/2024    PROTEINTOT 6.7 08/26/2024    ALBUMIN 4.4 08/26/2024    ALT 21 08/26/2024    AST 20 08/26/2024    ALKPHOS 33 (L) 08/26/2024    BILITOT 0.2 08/26/2024    EGFR 92.7 08/26/2024    GLOB 2.3 08/26/2024    AGRATIO 1.9 08/26/2024    BCR 14.1 08/26/2024    ANIONGAP 9.7 08/26/2024      Lab Results   Component Value Date    CHOL 198 08/26/2024    CHLPL 218 (H) 04/26/2021    TRIG 116 08/26/2024    HDL 55 08/26/2024     (H) 08/26/2024     Lab Results   Component Value Date    TSH 6.310 (H) 08/26/2024     Lab Results   Component Value Date    HGBA1C 5.60 02/21/2023             Assessment and Plan:   Today, we have reviewed her care.  ]Radha seems to be doing well overall today.  Regarding the Medicare wellness, she is currently up-to-date on recommended cancer screenings.  We will place an order for mammogram to be done next year when due.  She plans to schedule that on her own.  We reviewed vaccinations, but she does not want to move ahead with flu, COVID, or other vaccines.    Regarding her usual care, her blood pressure is just above goal on initial check.  It has been in a good range at home though.  We will make no immediate change in care.  We will recheck the pressure though.  We will  plan for her to have her thyroid rechecked later this month at the 90-day marnie from the most recent dose change.  We also reviewed osteoporosis care and will continue generic Fosamax.  Tentative follow-up with me will be again in about a year, sooner if needed.    Diagnoses and all orders for this visit:    1. Physical exam (Primary)    2. Essential hypertension  -     metoprolol succinate XL (TOPROL-XL) 25 MG 24 hr tablet; Take 1 tablet by mouth Every Night.  Dispense: 90 tablet; Refill: 3    3. Acquired hypothyroidism  -     TSH; Future    4. Postmenopausal osteoporosis  Comments:  As above.    5. Encounter for screening mammogram for malignant neoplasm of breast  -     Mammo Screening Digital Tomosynthesis Bilateral With CAD; Future       Follow Up  Return in about 1 year (around 11/4/2025) for Recheck, Medicare Wellness.  Patient was given instructions and counseling regarding her condition or for health maintenance advice. Please see specific information pulled into the AVS if appropriate.

## 2024-11-27 ENCOUNTER — LAB (OUTPATIENT)
Dept: LAB | Facility: HOSPITAL | Age: 69
End: 2024-11-27
Payer: MEDICARE

## 2024-11-27 DIAGNOSIS — E03.9 ACQUIRED HYPOTHYROIDISM: ICD-10-CM

## 2024-11-27 LAB — TSH SERPL DL<=0.05 MIU/L-ACNC: 0.8 UIU/ML (ref 0.27–4.2)

## 2024-11-27 PROCEDURE — 84443 ASSAY THYROID STIM HORMONE: CPT

## 2024-11-27 PROCEDURE — 36415 COLL VENOUS BLD VENIPUNCTURE: CPT

## 2024-12-18 DIAGNOSIS — I10 ESSENTIAL HYPERTENSION: ICD-10-CM

## 2024-12-18 RX ORDER — METOPROLOL SUCCINATE 25 MG/1
25 TABLET, EXTENDED RELEASE ORAL NIGHTLY
Qty: 90 TABLET | Refills: 3 | Status: SHIPPED | OUTPATIENT
Start: 2024-12-18

## 2025-03-11 ENCOUNTER — PATIENT MESSAGE (OUTPATIENT)
Dept: FAMILY MEDICINE CLINIC | Age: 70
End: 2025-03-11
Payer: MEDICARE

## 2025-03-11 DIAGNOSIS — F41.1 GENERALIZED ANXIETY DISORDER: ICD-10-CM

## 2025-03-11 RX ORDER — PAROXETINE 20 MG/1
20 TABLET, FILM COATED ORAL EVERY MORNING
Qty: 90 TABLET | Refills: 3 | Status: SHIPPED | OUTPATIENT
Start: 2025-03-11

## 2025-03-11 RX ORDER — PAROXETINE 20 MG/1
20 TABLET, FILM COATED ORAL EVERY MORNING
Qty: 90 TABLET | Refills: 0 | Status: SHIPPED | OUTPATIENT
Start: 2025-03-11 | End: 2025-03-11 | Stop reason: SDUPTHER

## 2025-04-12 ENCOUNTER — PATIENT MESSAGE (OUTPATIENT)
Dept: FAMILY MEDICINE CLINIC | Age: 70
End: 2025-04-12
Payer: MEDICARE

## 2025-04-12 DIAGNOSIS — D51.9 ANEMIA DUE TO VITAMIN B12 DEFICIENCY, UNSPECIFIED B12 DEFICIENCY TYPE: ICD-10-CM

## 2025-04-12 DIAGNOSIS — E78.00 HYPERCHOLESTEROLEMIA: ICD-10-CM

## 2025-04-12 DIAGNOSIS — I10 ESSENTIAL HYPERTENSION: Primary | ICD-10-CM

## 2025-04-12 DIAGNOSIS — E03.9 ACQUIRED HYPOTHYROIDISM: ICD-10-CM

## 2025-04-12 DIAGNOSIS — M81.0 POSTMENOPAUSAL OSTEOPOROSIS: ICD-10-CM

## 2025-04-12 DIAGNOSIS — Z79.899 OTHER LONG TERM (CURRENT) DRUG THERAPY: ICD-10-CM

## 2025-04-15 ENCOUNTER — LAB (OUTPATIENT)
Dept: LAB | Facility: HOSPITAL | Age: 70
End: 2025-04-15
Payer: MEDICARE

## 2025-04-15 DIAGNOSIS — M81.0 POSTMENOPAUSAL OSTEOPOROSIS: ICD-10-CM

## 2025-04-15 DIAGNOSIS — E78.00 HYPERCHOLESTEROLEMIA: ICD-10-CM

## 2025-04-15 DIAGNOSIS — I10 ESSENTIAL HYPERTENSION: ICD-10-CM

## 2025-04-15 DIAGNOSIS — E03.9 ACQUIRED HYPOTHYROIDISM: ICD-10-CM

## 2025-04-15 DIAGNOSIS — Z79.899 OTHER LONG TERM (CURRENT) DRUG THERAPY: ICD-10-CM

## 2025-04-15 DIAGNOSIS — D51.9 ANEMIA DUE TO VITAMIN B12 DEFICIENCY, UNSPECIFIED B12 DEFICIENCY TYPE: ICD-10-CM

## 2025-04-15 LAB
25(OH)D3 SERPL-MCNC: 67.2 NG/ML (ref 30–100)
ALBUMIN SERPL-MCNC: 4.3 G/DL (ref 3.5–5.2)
ALBUMIN/GLOB SERPL: 1.7 G/DL
ALP SERPL-CCNC: 32 U/L (ref 39–117)
ALT SERPL W P-5'-P-CCNC: 22 U/L (ref 1–33)
ANION GAP SERPL CALCULATED.3IONS-SCNC: 11 MMOL/L (ref 5–15)
AST SERPL-CCNC: 20 U/L (ref 1–32)
BILIRUB SERPL-MCNC: 0.3 MG/DL (ref 0–1.2)
BUN SERPL-MCNC: 8 MG/DL (ref 8–23)
BUN/CREAT SERPL: 11.9 (ref 7–25)
CALCIUM SPEC-SCNC: 9.1 MG/DL (ref 8.6–10.5)
CHLORIDE SERPL-SCNC: 101 MMOL/L (ref 98–107)
CHOLEST SERPL-MCNC: 253 MG/DL (ref 0–200)
CO2 SERPL-SCNC: 28 MMOL/L (ref 22–29)
CREAT SERPL-MCNC: 0.67 MG/DL (ref 0.57–1)
DEPRECATED RDW RBC AUTO: 45.5 FL (ref 37–54)
EGFRCR SERPLBLD CKD-EPI 2021: 94.7 ML/MIN/1.73
ERYTHROCYTE [DISTWIDTH] IN BLOOD BY AUTOMATED COUNT: 15.1 % (ref 12.3–15.4)
FOLATE SERPL-MCNC: >20 NG/ML (ref 4.78–24.2)
GLOBULIN UR ELPH-MCNC: 2.6 GM/DL
GLUCOSE SERPL-MCNC: 87 MG/DL (ref 65–99)
HCT VFR BLD AUTO: 44.2 % (ref 34–46.6)
HDLC SERPL-MCNC: 61 MG/DL (ref 40–60)
HGB BLD-MCNC: 13.5 G/DL (ref 12–15.9)
LDLC SERPL CALC-MCNC: 157 MG/DL (ref 0–100)
LDLC/HDLC SERPL: 2.51 {RATIO}
MCH RBC QN AUTO: 24.9 PG (ref 26.6–33)
MCHC RBC AUTO-ENTMCNC: 30.5 G/DL (ref 31.5–35.7)
MCV RBC AUTO: 81.4 FL (ref 79–97)
PLATELET # BLD AUTO: 377 10*3/MM3 (ref 140–450)
PMV BLD AUTO: 8.9 FL (ref 6–12)
POTASSIUM SERPL-SCNC: 3.9 MMOL/L (ref 3.5–5.2)
PROT SERPL-MCNC: 6.9 G/DL (ref 6–8.5)
RBC # BLD AUTO: 5.43 10*6/MM3 (ref 3.77–5.28)
SODIUM SERPL-SCNC: 140 MMOL/L (ref 136–145)
TRIGL SERPL-MCNC: 194 MG/DL (ref 0–150)
TSH SERPL DL<=0.05 MIU/L-ACNC: 0.77 UIU/ML (ref 0.27–4.2)
VIT B12 BLD-MCNC: 1016 PG/ML (ref 211–946)
VLDLC SERPL-MCNC: 35 MG/DL (ref 5–40)
WBC NRBC COR # BLD AUTO: 8 10*3/MM3 (ref 3.4–10.8)

## 2025-04-15 PROCEDURE — 82746 ASSAY OF FOLIC ACID SERUM: CPT

## 2025-04-15 PROCEDURE — 85027 COMPLETE CBC AUTOMATED: CPT

## 2025-04-15 PROCEDURE — 80053 COMPREHEN METABOLIC PANEL: CPT

## 2025-04-15 PROCEDURE — 82607 VITAMIN B-12: CPT

## 2025-04-15 PROCEDURE — 84443 ASSAY THYROID STIM HORMONE: CPT

## 2025-04-15 PROCEDURE — 80061 LIPID PANEL: CPT

## 2025-04-15 PROCEDURE — 36415 COLL VENOUS BLD VENIPUNCTURE: CPT

## 2025-04-15 PROCEDURE — 82306 VITAMIN D 25 HYDROXY: CPT

## 2025-04-16 ENCOUNTER — RESULTS FOLLOW-UP (OUTPATIENT)
Dept: FAMILY MEDICINE CLINIC | Age: 70
End: 2025-04-16
Payer: MEDICARE

## 2025-04-25 ENCOUNTER — PATIENT MESSAGE (OUTPATIENT)
Dept: FAMILY MEDICINE CLINIC | Age: 70
End: 2025-04-25
Payer: MEDICARE

## 2025-05-21 ENCOUNTER — HOSPITAL ENCOUNTER (OUTPATIENT)
Dept: MAMMOGRAPHY | Facility: HOSPITAL | Age: 70
Discharge: HOME | End: 2025-05-21
Admitting: FAMILY MEDICINE
Payer: MEDICARE

## 2025-05-21 DIAGNOSIS — Z12.31 ENCOUNTER FOR SCREENING MAMMOGRAM FOR MALIGNANT NEOPLASM OF BREAST: ICD-10-CM

## 2025-05-21 PROCEDURE — 77067 SCR MAMMO BI INCL CAD: CPT

## 2025-05-21 PROCEDURE — 77063 BREAST TOMOSYNTHESIS BI: CPT

## 2025-06-04 ENCOUNTER — PATIENT MESSAGE (OUTPATIENT)
Dept: FAMILY MEDICINE CLINIC | Age: 70
End: 2025-06-04
Payer: MEDICARE

## 2025-06-12 ENCOUNTER — OFFICE VISIT (OUTPATIENT)
Dept: FAMILY MEDICINE CLINIC | Age: 70
End: 2025-06-12
Payer: MEDICARE

## 2025-06-12 VITALS
WEIGHT: 113.4 LBS | DIASTOLIC BLOOD PRESSURE: 63 MMHG | TEMPERATURE: 98.1 F | SYSTOLIC BLOOD PRESSURE: 128 MMHG | BODY MASS INDEX: 22.86 KG/M2 | OXYGEN SATURATION: 97 % | HEIGHT: 59 IN | HEART RATE: 72 BPM

## 2025-06-12 DIAGNOSIS — Z13.6 ENCOUNTER FOR SCREENING FOR CORONARY ARTERY DISEASE: ICD-10-CM

## 2025-06-12 DIAGNOSIS — F33.2 SEVERE EPISODE OF RECURRENT MAJOR DEPRESSIVE DISORDER, WITHOUT PSYCHOTIC FEATURES: ICD-10-CM

## 2025-06-12 DIAGNOSIS — R51.9 NONINTRACTABLE HEADACHE, UNSPECIFIED CHRONICITY PATTERN, UNSPECIFIED HEADACHE TYPE: ICD-10-CM

## 2025-06-12 DIAGNOSIS — E78.00 HYPERCHOLESTEROLEMIA: ICD-10-CM

## 2025-06-12 DIAGNOSIS — H53.2 DIPLOPIA: Primary | ICD-10-CM

## 2025-06-12 RX ORDER — BUPROPION HYDROCHLORIDE 150 MG/1
150 TABLET ORAL DAILY
Qty: 30 TABLET | Refills: 1 | Status: SHIPPED | OUTPATIENT
Start: 2025-06-12

## 2025-06-12 RX ORDER — FERROUS SULFATE 325(65) MG
325 TABLET ORAL EVERY OTHER DAY
COMMUNITY

## 2025-06-12 NOTE — Clinical Note
Please TICKLE to call her in 3 weeks.  Is Wellbutrin XL helpful with anxiety depression?  Should we continue it longer term?  Increase the dose?  Thanks.

## 2025-06-12 NOTE — PROGRESS NOTES
Radha Mittal presents to [facility].    Chief Complaint:  Double vision    Subjective   History of Present Illness:  Radha is being seen today for follow up on her care.  She had an episode of double vision that lasted for about 45 minutes.  This seemed to be mostly distance vision.  She has also had some mild headache.  She did not have lateralizing weakness or numbness.  She has had a problem with tinnitus that she describes as being 'almost debilitating'.  Radha has had nausea since then.  She is not having headache today.  She did have blood work in the last few weeks that did not point to obvious finding of concern.  She is having ongoing problems with feeling depressed.  She did have MRI brain in  that did not show obvious finding of concern.    Radha has been experiencing ongoing issues with anxiety and depression.  She is not currently taking paroxetine due to concerns about tinnitus with sertraline.  She has been more down.  She denies active intention to harm herself, but she also says that she would be okay with going on to heaven so to speak.  Her  has noticed a change in her emotions as well.  She denies any auditory or visual hallucinations.    Review of Systems:  Review of Systems   Constitutional:  Negative for chills and fever.   HENT:  Positive for tinnitus.    Eyes:  Positive for double vision.   Respiratory:  Negative for cough and shortness of breath.    Cardiovascular:  Negative for chest pain and palpitations.   Gastrointestinal:  Negative for abdominal pain, nausea and vomiting.      Objective   Medical History:  Past Medical History:    Allergic    Depression    Disease of thyroid gland    Diverticulosis    Hemorrhoids    Hypertension    Polyp of hepatic flexure of colon     Past Surgical History:    CARDIAC ABLATION     SECTION    x2    COLONOSCOPY    Internal hemorrhoids, diverticulosis    COLONOSCOPY W/ BIOPSIES AND POLYPECTOMY       Family History   Problem Relation Age of Onset    Anxiety disorder Mother     Hyperlipidemia Mother     Thyroid disease Mother     Depression Father     Hyperlipidemia Father     Hyperlipidemia Sister     Thyroid disease Sister      Social History     Tobacco Use    Smoking status: Never    Smokeless tobacco: Never   Substance Use Topics    Alcohol use: Not Currently       Health Maintenance Due   Topic Date Due    TDAP/TD VACCINES (1 - Tdap) Never done        Immunization History   Administered Date(s) Administered    COVID-19 (PFIZER) Purple Cap Monovalent 09/10/2021, 10/01/2021    Flu Vaccine Split Quad 10/19/2020    Fluzone (or Fluarix & Flulaval for VFC) >6mos 10/19/2020    Fluzone High-Dose 65+yrs 10/19/2021    Pneumococcal Polysaccharide (PPSV23) 04/26/2021    Shingrix 07/22/2020, 10/19/2020    Zostavax 12/20/2013       Allergies   Allergen Reactions    Adhesive Tape Rash     Baindaids, tapes, etc.    Latex Rash    Sulfa Antibiotics Unknown - Low Severity        Medications:    Current Outpatient Medications:     alendronate (FOSAMAX) 70 MG tablet, Take 1 tablet by mouth Every 7 (Seven) Days., Disp: 13 tablet, Rfl: 3    aspirin 81 MG EC tablet, Aspir-81 81 mg oral tablet,delayed release (DR/EC) take 1 tablet (81 mg) by oral route once daily   Active, Disp: , Rfl:     calcium carbonate (OS-DIANA) 1250 (500 Ca) MG tablet, Calcium 500 500 mg calcium (1,250 mg) oral tablet take 1 tablet by oral route daily   Active, Disp: , Rfl:     Cholecalciferol 25 MCG (1000 UT) capsule, Vitamin D3 1,000 unit oral capsule take 1 capsule by oral route daily   Active, Disp: , Rfl:     ferrous sulfate 325 (65 FE) MG tablet, Take 1 tablet by mouth Every Other Day. OTC, Disp: , Rfl:     levothyroxine (SYNTHROID, LEVOTHROID) 75 MCG tablet, Take 1 tablet by mouth Every Morning., Disp: 90 tablet, Rfl: 3    Magnesium 250 MG tablet, Take  by mouth Daily., Disp: , Rfl:     metoprolol succinate XL (TOPROL-XL) 25 MG 24 hr tablet, Take 1  "tablet by mouth Every Night., Disp: 90 tablet, Rfl: 3    rosuvastatin (Crestor) 5 MG tablet, Take 1 tablet by mouth Daily., Disp: 90 tablet, Rfl: 3    vitamin B-12 (CYANOCOBALAMIN) 1000 MCG tablet, Take 1 tablet by mouth Every Other Day. (Patient taking differently: Take 1 tablet by mouth. Every 3 days), Disp: , Rfl:     buPROPion XL (Wellbutrin XL) 150 MG 24 hr tablet, Take 1 tablet by mouth Daily., Disp: 30 tablet, Rfl: 1    Vital Signs:   /63 (BP Location: Left arm, Patient Position: Sitting)   Pulse 72   Temp 98.1 °F (36.7 °C) (Oral)   Ht 149.9 cm (59.02\")   Wt 51.4 kg (113 lb 6.4 oz)   SpO2 97%   BMI 22.89 kg/m²       Physical Exam:  Physical Exam  Vitals and nursing note reviewed.   Constitutional:       General: She is not in acute distress.     Appearance: She is not ill-appearing.   HENT:      Right Ear: Tympanic membrane and ear canal normal.      Left Ear: Tympanic membrane and ear canal normal.      Mouth/Throat:      Mouth: Mucous membranes are moist.      Comments: Pharynx appears normal  Eyes:      Extraocular Movements: Extraocular movements intact.      Pupils: Pupils are equal, round, and reactive to light.   Neck:      Thyroid: No thyromegaly.   Cardiovascular:      Rate and Rhythm: Normal rate and regular rhythm.      Heart sounds: No murmur heard.  Pulmonary:      Effort: Pulmonary effort is normal.      Breath sounds: Normal breath sounds.   Abdominal:      General: There is no distension.      Palpations: Abdomen is soft. There is no mass.      Tenderness: There is no abdominal tenderness.   Musculoskeletal:      Cervical back: Normal range of motion.      Right lower leg: No edema.      Left lower leg: No edema.   Skin:     Findings: No lesion or rash.   Neurological:      General: No focal deficit present.      Mental Status: She is oriented to person, place, and time.      Cranial Nerves: No cranial nerve deficit.      Motor: No weakness.      Coordination: Coordination normal. "      Gait: Gait normal.   Psychiatric:         Mood and Affect: Mood normal.     Result Review   The following data was reviewed by Edwin Melissa MD on 06/12/2025.  Lab Results   Component Value Date    WBC 8.00 04/15/2025    HGB 13.5 04/15/2025    HCT 44.2 04/15/2025    MCV 81.4 04/15/2025     04/15/2025     Lab Results   Component Value Date    GLUCOSE 87 04/15/2025    BUN 8 04/15/2025    CREATININE 0.67 04/15/2025     04/15/2025    K 3.9 04/15/2025     04/15/2025    CALCIUM 9.1 04/15/2025    PROTEINTOT 6.9 04/15/2025    ALBUMIN 4.3 04/15/2025    ALT 22 04/15/2025    AST 20 04/15/2025    ALKPHOS 32 (L) 04/15/2025    BILITOT 0.3 04/15/2025    GLOB 2.6 04/15/2025    AGRATIO 1.7 04/15/2025    BCR 11.9 04/15/2025    ANIONGAP 11.0 04/15/2025    EGFR 94.7 04/15/2025     Lab Results   Component Value Date    CHOL 253 (H) 04/15/2025    CHLPL 218 (H) 04/26/2021    TRIG 194 (H) 04/15/2025    HDL 61 (H) 04/15/2025     (H) 04/15/2025     Lab Results   Component Value Date    TSH 0.766 04/15/2025     Lab Results   Component Value Date    HGBA1C 5.60 02/21/2023     BMI is within normal parameters. No other follow-up for BMI required.         Assessment and Plan:   Today, we have reviewed her care.  I am concerned about the symptoms that she had earlier this week.  It is possible they represented a TIA or other neurologic event.  We will move forward with additional evaluation of this as noted below.  I did ask her to consider going to the emergency department if she has a recurrent episode like that.  With regard to her cholesterol, we will move ahead with CT cardiac calcium score to better assess her risk.  We also discussed the ongoing issues with depression and anxiety.  She is not currently taking paroxetine, and it will be discontinued.  We will give her a trial of generic Wellbutrin.  Again, she denies active intention to harm herself, but she has been struggling.  We will reach out to  her again in about 3 weeks to see how she is doing emotionally.  Close follow-up is anticipated.    Diagnoses and all orders for this visit:    1. Diplopia (Primary)  -     MRI Brain With & Without Contrast; Future  -     Duplex Carotid Ultrasound CAR; Future    2. Nonintractable headache, unspecified chronicity pattern, unspecified headache type  -     MRI Brain With & Without Contrast; Future  -     Duplex Carotid Ultrasound CAR; Future    3. Hypercholesterolemia  -     CT Cardiac Calcium Score Without Dye; Future    4. Severe episode of recurrent major depressive disorder, without psychotic features  -     buPROPion XL (Wellbutrin XL) 150 MG 24 hr tablet; Take 1 tablet by mouth Daily.  Dispense: 30 tablet; Refill: 1    5. Encounter for screening for coronary artery disease  -     CT Cardiac Calcium Score Without Dye; Future    Follow Up  Return in about 5 months (around 11/7/2025) for Recheck, Next scheduled follow up.  Patient was given instructions and counseling regarding her condition or for health maintenance advice. Please see specific information pulled into the AVS if appropriate.

## 2025-06-24 ENCOUNTER — OFFICE VISIT (OUTPATIENT)
Dept: FAMILY MEDICINE CLINIC | Age: 70
End: 2025-06-24
Payer: MEDICARE

## 2025-06-24 VITALS
HEIGHT: 59 IN | SYSTOLIC BLOOD PRESSURE: 120 MMHG | HEART RATE: 97 BPM | OXYGEN SATURATION: 98 % | DIASTOLIC BLOOD PRESSURE: 80 MMHG | WEIGHT: 113.2 LBS | BODY MASS INDEX: 22.82 KG/M2 | TEMPERATURE: 97.8 F

## 2025-06-24 DIAGNOSIS — F33.2 SEVERE EPISODE OF RECURRENT MAJOR DEPRESSIVE DISORDER, WITHOUT PSYCHOTIC FEATURES: ICD-10-CM

## 2025-06-24 DIAGNOSIS — G89.18 DISCOMFORT RELATED TO PROCEDURE: ICD-10-CM

## 2025-06-24 DIAGNOSIS — I10 ESSENTIAL HYPERTENSION: Primary | ICD-10-CM

## 2025-06-24 PROCEDURE — 3079F DIAST BP 80-89 MM HG: CPT | Performed by: NURSE PRACTITIONER

## 2025-06-24 PROCEDURE — 1126F AMNT PAIN NOTED NONE PRSNT: CPT | Performed by: NURSE PRACTITIONER

## 2025-06-24 PROCEDURE — 3074F SYST BP LT 130 MM HG: CPT | Performed by: NURSE PRACTITIONER

## 2025-06-24 PROCEDURE — 99214 OFFICE O/P EST MOD 30 MIN: CPT | Performed by: NURSE PRACTITIONER

## 2025-06-24 RX ORDER — LORAZEPAM 1 MG/1
1 TABLET ORAL ONCE
Qty: 1 TABLET | Refills: 0 | Status: SHIPPED | OUTPATIENT
Start: 2025-06-24 | End: 2025-06-24

## 2025-06-24 NOTE — PROGRESS NOTES
Please note that portions of this document were completed using a voice recognition program.     Patient or patient representative verbalized consent for the use of Ambient Listening during the visit with  JACK Pinto for chart documentation. 6/24/2025  08:04 EDT    Chief Complaint  Depression (F/u from 6/12/25, has been having symptoms from Wellbutrin  )    Catia Mittal presents to [facility] today for       History of Present Illness  The patient is a 70-year-old female who presents for follow-up on depression symptoms. She has started Wellbutrin recently. She is accompanied by her .    She continues to experience feelings of depression. She has been prescribed Wellbutrin 150 mg, of which she has taken only 8 doses due to concerns about potential side effects. She has not taken the medication for the past 5 days. She has previously been on Zoloft and Paxil, with the latter causing some issues. She has not tried Cymbalta.    She reports experiencing tinnitus in her left ear, which she believes is exacerbated by certain medications, including metoprolol. She also notes that her blood pressure tends to increase when her tinnitus worsens. Her  is currently on gabapentin and has read that it may help with tinnitus. She has previously taken Ativan for an MRI when her tinnitus first started.    She has a history of double vision, and is scheduled for a MRI, wants to see if she can have sooner.  She has not experienced this symptom again since discontinuing the medication. An MRI of the brain is scheduled for 07/03/2025. She also reports a sensation of fullness in her right ear today. She has been using Claritin and nasal spray as needed.    Her blood pressure was elevated at 132/97 during this visit, although she reports it was 110 at home on Monday. She monitors her blood pressure at home and takes metoprolol.    She has thumb replacement  surgery scheduled for 07/08/2025. She had an ablation in 12/2016.    PAST SURGICAL HISTORY:  Ablation in 12/2016          Current Outpatient Medications:     alendronate (FOSAMAX) 70 MG tablet, Take 1 tablet by mouth Every 7 (Seven) Days., Disp: 13 tablet, Rfl: 3    aspirin 81 MG EC tablet, Aspir-81 81 mg oral tablet,delayed release (DR/EC) take 1 tablet (81 mg) by oral route once daily   Active, Disp: , Rfl:     calcium carbonate (OS-DIANA) 1250 (500 Ca) MG tablet, Calcium 500 500 mg calcium (1,250 mg) oral tablet take 1 tablet by oral route daily   Active, Disp: , Rfl:     Cholecalciferol 25 MCG (1000 UT) capsule, Vitamin D3 1,000 unit oral capsule take 1 capsule by oral route daily   Active, Disp: , Rfl:     ferrous sulfate 325 (65 FE) MG tablet, Take 1 tablet by mouth Every Other Day. OTC, Disp: , Rfl:     levothyroxine (SYNTHROID, LEVOTHROID) 75 MCG tablet, Take 1 tablet by mouth Every Morning., Disp: 90 tablet, Rfl: 3    Magnesium 250 MG tablet, Take  by mouth Daily., Disp: , Rfl:     metoprolol succinate XL (TOPROL-XL) 25 MG 24 hr tablet, Take 1 tablet by mouth Every Night., Disp: 90 tablet, Rfl: 3    rosuvastatin (Crestor) 5 MG tablet, Take 1 tablet by mouth Daily., Disp: 90 tablet, Rfl: 3    vitamin B-12 (CYANOCOBALAMIN) 1000 MCG tablet, Take 1 tablet by mouth Every Other Day. (Patient taking differently: Take 1 tablet by mouth. Every 3 days), Disp: , Rfl:     LORazepam (Ativan) 1 MG tablet, Take 1 tablet by mouth 1 (One) Time for 1 dose. Take 1 tablet 30 min to 1 hour prior to procedure, Disp: 1 tablet, Rfl: 0  Medications Discontinued During This Encounter   Medication Reason    buPROPion XL (Wellbutrin XL) 150 MG 24 hr tablet Not Efficacious         Allergies:  Adhesive tape, Latex, and Sulfa antibiotics      Objective   Vital Signs:   Vitals:    06/24/25 0800 06/24/25 0820   BP: 132/97 120/80   BP Location: Left arm Left arm   Patient Position: Sitting    Cuff Size: Adult    Pulse: 97    Temp: 97.8 °F  "(36.6 °C)    TempSrc: Oral    SpO2: 98%    Weight: 51.3 kg (113 lb 3.2 oz)    Height: 149.9 cm (59.02\")      Body mass index is 22.85 kg/m².  BMI is within normal parameters. No other follow-up for BMI required.        Physical Exam  Constitutional:       Appearance: Normal appearance.   Neck:      Vascular: No carotid bruit.   Cardiovascular:      Rate and Rhythm: Normal rate and regular rhythm.      Heart sounds: Normal heart sounds.   Pulmonary:      Effort: Pulmonary effort is normal.      Breath sounds: Normal breath sounds.   Musculoskeletal:         General: Normal range of motion.   Skin:     General: Skin is warm and dry.   Neurological:      General: No focal deficit present.      Mental Status: She is alert.   Psychiatric:         Mood and Affect: Mood normal.         Behavior: Behavior normal.             Lab Results   Component Value Date    GLUCOSE 87 04/15/2025    BUN 8 04/15/2025    CREATININE 0.67 04/15/2025    EGFRIFNONA 97 12/30/2021    EGFRIFAFRI 117 12/30/2021    BCR 11.9 04/15/2025    K 3.9 04/15/2025    CO2 28.0 04/15/2025    CALCIUM 9.1 04/15/2025    ALBUMIN 4.3 04/15/2025    AST 20 04/15/2025    ALT 22 04/15/2025       Lab Results   Component Value Date    CHOL 253 (H) 04/15/2025    TRIG 194 (H) 04/15/2025    HDL 61 (H) 04/15/2025     (H) 04/15/2025       Lab Results   Component Value Date    WBC 8.00 04/15/2025    HGB 13.5 04/15/2025    HCT 44.2 04/15/2025    MCV 81.4 04/15/2025     04/15/2025                     Procedures         Diagnoses and all orders for this visit:    1. Essential hypertension (Primary)  Comments:  Continue same meds, monitoring BP at home    2. Discomfort related to procedure  -     LORazepam (Ativan) 1 MG tablet; Take 1 tablet by mouth 1 (One) Time for 1 dose. Take 1 tablet 30 min to 1 hour prior to procedure  Dispense: 1 tablet; Refill: 0    3. Severe episode of recurrent major depressive disorder, without psychotic features  Comments:  Restart " Wellbutrin , give it at least one week to 10 days , if symptoms re-occur notify office          Assessment & Plan  1. Depression.  - Experiencing depression symptoms; previously on sertraline and Paxil.  - Started Wellbutrin but discontinued after eight doses due to concerns about visual disturbances.  - Cymbalta discussed as an alternative option, noting its use for depression and joint pain, with a lower incidence of side effects.  - Advised to restart Wellbutrin for a minimum of 5 days to assess effectiveness and side effects; discontinue if symptoms persist or worsen.    2. Tinnitus.  - Reports severe tinnitus in left ear, worsens with certain medications and increases blood pressure.  - Gabapentin for tinnitus discussed; advised to review its side effect profile before starting.  - Prescription for Ativan provided to manage anxiety during upcoming MRI scan.  - Advised to use fluticasone nasal spray, one spray in each nostril daily, to help with ear drainage.    3. Elevated blood pressure.  - Blood pressure noted to be 132/97 during visit; reports lower readings at home.  - Advised to continue monitoring blood pressure at home and maintain current medication regimen, including metoprolol.    4. Double vision.  - Reported experiencing double vision a few weeks ago; prompted MRI of the brain scheduled for 07/03/2025.  - Advised to follow up with Dr. Melissa regarding MRI results and any further neurological evaluation if needed.    Nurse will call in about 10 days to see how the Wellbutrin is doing  and if symptoms are present               Follow Up  Return for Next scheduled follow up.  Patient was given instructions and counseling regarding her condition or for health maintenance advice. Please see specific information pulled into the AVS if appropriate.           Brooke Johnston, JACK  06/24/2025

## 2025-06-26 ENCOUNTER — TELEPHONE (OUTPATIENT)
Dept: FAMILY MEDICINE CLINIC | Age: 70
End: 2025-06-26
Payer: MEDICARE

## 2025-06-26 ENCOUNTER — RESULTS FOLLOW-UP (OUTPATIENT)
Dept: FAMILY MEDICINE CLINIC | Age: 70
End: 2025-06-26
Payer: MEDICARE

## 2025-06-26 ENCOUNTER — TRANSCRIBE ORDERS (OUTPATIENT)
Dept: ADMINISTRATIVE | Facility: HOSPITAL | Age: 70
End: 2025-06-26
Payer: MEDICARE

## 2025-06-26 ENCOUNTER — HOSPITAL ENCOUNTER (OUTPATIENT)
Dept: CT IMAGING | Facility: HOSPITAL | Age: 70
Discharge: HOME | End: 2025-06-26
Admitting: FAMILY MEDICINE

## 2025-06-26 DIAGNOSIS — Z01.818 PRE-OP TESTING: ICD-10-CM

## 2025-06-26 DIAGNOSIS — Z13.6 ENCOUNTER FOR SCREENING FOR CORONARY ARTERY DISEASE: ICD-10-CM

## 2025-06-26 DIAGNOSIS — E78.00 HYPERCHOLESTEROLEMIA: ICD-10-CM

## 2025-06-26 DIAGNOSIS — M18.12 ARTHRITIS OF CARPOMETACARPAL (CMC) JOINT OF LEFT THUMB: Primary | ICD-10-CM

## 2025-06-26 PROCEDURE — 75571 CT HRT W/O DYE W/CA TEST: CPT

## 2025-06-26 NOTE — TELEPHONE ENCOUNTER
Pt is having surgery 7/8/25, pt states they advised her she would need to stop her aspirin prior to surgery, pt is needing to know how soon before she needs to stop. States she has been on aspirin since 2016 when she had a heart ablation, please advise.

## 2025-06-27 ENCOUNTER — HOSPITAL ENCOUNTER (OUTPATIENT)
Facility: HOSPITAL | Age: 70
Discharge: HOME | End: 2025-06-27
Payer: MEDICARE

## 2025-06-27 ENCOUNTER — LAB (OUTPATIENT)
Facility: HOSPITAL | Age: 70
End: 2025-06-27
Payer: MEDICARE

## 2025-06-27 DIAGNOSIS — Z01.818 PRE-OP TESTING: ICD-10-CM

## 2025-06-27 DIAGNOSIS — M18.12 ARTHRITIS OF CARPOMETACARPAL (CMC) JOINT OF LEFT THUMB: ICD-10-CM

## 2025-06-27 LAB
ANION GAP SERPL CALCULATED.3IONS-SCNC: 11.2 MMOL/L (ref 5–15)
BUN SERPL-MCNC: 10 MG/DL (ref 8–23)
BUN/CREAT SERPL: 13.3 (ref 7–25)
CALCIUM SPEC-SCNC: 8.9 MG/DL (ref 8.6–10.5)
CHLORIDE SERPL-SCNC: 99 MMOL/L (ref 98–107)
CO2 SERPL-SCNC: 25.8 MMOL/L (ref 22–29)
CREAT SERPL-MCNC: 0.75 MG/DL (ref 0.57–1)
EGFRCR SERPLBLD CKD-EPI 2021: 86.3 ML/MIN/1.73
GLUCOSE SERPL-MCNC: 98 MG/DL (ref 65–99)
POTASSIUM SERPL-SCNC: 4.2 MMOL/L (ref 3.5–5.2)
QT INTERVAL: 374 MS
QTC INTERVAL: 398 MS
SODIUM SERPL-SCNC: 136 MMOL/L (ref 136–145)

## 2025-06-27 PROCEDURE — 36415 COLL VENOUS BLD VENIPUNCTURE: CPT

## 2025-06-27 PROCEDURE — 93005 ELECTROCARDIOGRAM TRACING: CPT | Performed by: NURSE PRACTITIONER

## 2025-06-27 PROCEDURE — 80048 BASIC METABOLIC PNL TOTAL CA: CPT

## 2025-06-27 NOTE — TELEPHONE ENCOUNTER
Per Dr Chelsea Johnson's office does not advise patients on when to stop their blood thinners, they have the patient check with the provider who is monitoring and prescribing the medication, pt was released by cardio and Dr Melissa has been monitoring since, please advise.

## 2025-07-04 LAB
QT INTERVAL: 374 MS
QTC INTERVAL: 398 MS

## 2025-07-07 ENCOUNTER — TELEPHONE (OUTPATIENT)
Dept: FAMILY MEDICINE CLINIC | Age: 70
End: 2025-07-07
Payer: MEDICARE

## 2025-07-07 DIAGNOSIS — R94.31 ABNORMAL EKG: Primary | ICD-10-CM

## 2025-07-07 DIAGNOSIS — F33.2 SEVERE EPISODE OF RECURRENT MAJOR DEPRESSIVE DISORDER, WITHOUT PSYCHOTIC FEATURES: Primary | ICD-10-CM

## 2025-07-07 DIAGNOSIS — H53.9 VISION CHANGES: Primary | ICD-10-CM

## 2025-07-07 RX ORDER — CITALOPRAM HYDROBROMIDE 10 MG/1
10 TABLET ORAL DAILY
Qty: 30 TABLET | Refills: 0 | Status: SHIPPED | OUTPATIENT
Start: 2025-07-07

## 2025-07-07 NOTE — TELEPHONE ENCOUNTER
----- Message from Meggan Johnston sent at 6/24/2025  8:41 AM EDT -----  Call in about 10 days to see how Wellbutrin is working , any side effects

## 2025-07-07 NOTE — TELEPHONE ENCOUNTER
Regarding referral to Cardiology , I am un aware of any abnormal EKG, we will need a copy of this to see what the abnormality was with the EKG. Regarding anxiety/depression med , we can try low dose celexa to see if that works, I will send to pharmacy

## 2025-07-07 NOTE — TELEPHONE ENCOUNTER
Spoke with pt who stated she stopped taking the Wellbutrin last Thursday, she was trying to do a survey and she kept seeing little circles all over the paper. She stated she has tried paxil 10mg and 20mg. The 20mg made her heart race. She also has tried Zoloft which gave her ringing of the ears. She does feel like she needs something and was tearful on the phone. She stated she had an EKG done 6/26/25 and it was abnormal at the hospital, but they did not refer her to a cardiologist and she would like a referral. She also stated she is seeing Henry for double vision, but also needs that referral if possible. Her appt is 7/23/25. She is ok with trying another antidepressant.

## 2025-07-08 ENCOUNTER — TELEPHONE (OUTPATIENT)
Dept: FAMILY MEDICINE CLINIC | Age: 70
End: 2025-07-08
Payer: MEDICARE

## 2025-07-08 NOTE — TELEPHONE ENCOUNTER
Dr. Macias read the EKG , does not appear to be abnormal rhythm but did show some changes from years ago , while these changes may be nothing significant, did put referral in to Cardiology per pt request to discuss with them        ----- Message -----  From: Fannie Jama LPN  Sent: 7/7/2025   3:50 PM EDT  To: JACK Fowler

## 2025-07-16 DIAGNOSIS — E78.00 HYPERCHOLESTEROLEMIA: ICD-10-CM

## 2025-07-16 RX ORDER — ROSUVASTATIN CALCIUM 5 MG/1
5 TABLET, COATED ORAL DAILY
Qty: 90 TABLET | Refills: 3 | Status: SHIPPED | OUTPATIENT
Start: 2025-07-16

## 2025-07-16 NOTE — TELEPHONE ENCOUNTER
Please call Radha regarding these questions.  The CT cardiac calcium score showed minimal plaque.  The carotid Doppler testing does not show any evidence for developing blockage of the carotid arteries, but there is some plaque in the carotid bulbs.  I would lean toward using cholesterol-lowering medication because of the finding of some plaque involving the carotids.  She may decline this.  Please clarify if she has other questions that we can help with.  Thanks.

## 2025-07-16 NOTE — TELEPHONE ENCOUNTER
Pt inf, she is agreeable to taking chol med, resent rx to [facility] mail order pharmacy.

## 2025-07-29 DIAGNOSIS — M81.0 POSTMENOPAUSAL OSTEOPOROSIS: ICD-10-CM

## 2025-07-29 RX ORDER — ALENDRONATE SODIUM 70 MG/1
70 TABLET ORAL
Qty: 13 TABLET | Refills: 3 | Status: SHIPPED | OUTPATIENT
Start: 2025-07-29

## 2025-07-29 RX ORDER — BUPROPION HYDROCHLORIDE 150 MG/1
150 TABLET ORAL DAILY
Qty: 90 TABLET | Refills: 1 | Status: CANCELLED | OUTPATIENT
Start: 2025-07-29

## 2025-07-29 NOTE — TELEPHONE ENCOUNTER
Caller: Radha Mittal    Relationship: Self    Best call back number:     [phone]     What medication are you requesting: buPROPion XL (Wellbutrin XL) 150 MG 24 hr tablet     If a prescription is needed, what is your preferred pharmacy and phone number: [facility] - [facility] [address] - [phone]  - [phone] FX     Additional notes: PATIENT WOULD LIKE TO HAVE PRESCRIPTION OF MEDICATION LISTED SENT A 90 SUPPLY AS IT MORE COST EFFICIENT FOR THE PATIENT OVERALL. PLEASE ADVISE IF ABLE TO DO AS WELL ONCE ORDER IS SENT.

## 2025-07-29 NOTE — TELEPHONE ENCOUNTER
Caller: Radha Mittal    Relationship: Self    Best call back number:     [phone]      Requested Prescriptions:   Requested Prescriptions     Pending Prescriptions Disp Refills    alendronate (FOSAMAX) 70 MG tablet 13 tablet 3     Sig: Take 1 tablet by mouth Every 7 (Seven) Days.        Pharmacy where request should be sent: [facility] - [facility] [address] - [phone]  - [phone]     Last office visit with prescribing clinician: 6/12/2025   Last telemedicine visit with prescribing clinician: Visit date not found   Next office visit with prescribing clinician: 11/7/2025     Does the patient have less than a 3 day supply:  [] Yes  [x] No    Would you like a call back once the refill request has been completed: [] Yes [x] No    If the office needs to give you a call back, can they leave a voicemail: [] Yes [x] No    Barak Wade Rep   07/29/25 11:42 EDT

## 2025-08-05 ENCOUNTER — OFFICE VISIT (OUTPATIENT)
Age: 70
End: 2025-08-05
Payer: MEDICARE

## 2025-08-05 VITALS
BODY MASS INDEX: 22.94 KG/M2 | WEIGHT: 113.8 LBS | SYSTOLIC BLOOD PRESSURE: 126 MMHG | DIASTOLIC BLOOD PRESSURE: 65 MMHG | HEART RATE: 72 BPM | HEIGHT: 59 IN

## 2025-08-05 DIAGNOSIS — R94.31 ABNORMAL EKG: ICD-10-CM

## 2025-08-05 DIAGNOSIS — E78.2 MIXED HYPERLIPIDEMIA: ICD-10-CM

## 2025-08-05 DIAGNOSIS — I10 ESSENTIAL HYPERTENSION: Primary | ICD-10-CM

## 2025-08-05 DIAGNOSIS — I25.10 CORONARY ARTERY CALCIFICATION: ICD-10-CM

## 2025-08-05 PROCEDURE — 99204 OFFICE O/P NEW MOD 45 MIN: CPT | Performed by: INTERNAL MEDICINE

## 2025-08-05 PROCEDURE — 3074F SYST BP LT 130 MM HG: CPT | Performed by: INTERNAL MEDICINE

## 2025-08-05 PROCEDURE — 3078F DIAST BP <80 MM HG: CPT | Performed by: INTERNAL MEDICINE

## 2025-08-05 RX ORDER — MULTIVIT WITH MINERALS/LUTEIN
250 TABLET ORAL DAILY
COMMUNITY

## 2025-08-07 ENCOUNTER — PATIENT MESSAGE (OUTPATIENT)
Dept: FAMILY MEDICINE CLINIC | Age: 70
End: 2025-08-07
Payer: MEDICARE

## 2025-08-22 ENCOUNTER — TELEPHONE (OUTPATIENT)
Dept: FAMILY MEDICINE CLINIC | Age: 70
End: 2025-08-22
Payer: MEDICARE

## (undated) DEVICE — KT ORCA ORCAPOD DISP STRL

## (undated) DEVICE — GOWN PROC ENDOARMOR GI LVL3 HY/SHLD UNIV

## (undated) DEVICE — CANN NASL CO2 TRULINK W/O2 A/

## (undated) DEVICE — Device

## (undated) DEVICE — FLEX ADVANTAGE 1500CC: Brand: FLEX ADVANTAGE